# Patient Record
Sex: FEMALE | Race: BLACK OR AFRICAN AMERICAN | Employment: FULL TIME | URBAN - METROPOLITAN AREA
[De-identification: names, ages, dates, MRNs, and addresses within clinical notes are randomized per-mention and may not be internally consistent; named-entity substitution may affect disease eponyms.]

---

## 2021-04-30 PROBLEM — Z34.00 PRIMIGRAVIDA, ANTEPARTUM: Status: ACTIVE | Noted: 2021-04-30

## 2021-05-05 PROBLEM — O43.199 MARGINAL INSERTION OF UMBILICAL CORD AFFECTING MANAGEMENT OF MOTHER: Status: ACTIVE | Noted: 2021-05-05

## 2021-05-10 PROBLEM — O34.42 HISTORY OF CERVICAL LEEP BIOPSY AFFECTING CARE OF MOTHER, ANTEPARTUM, SECOND TRIMESTER: Status: ACTIVE | Noted: 2021-05-10

## 2021-05-10 PROBLEM — Z3A.24 24 WEEKS GESTATION OF PREGNANCY: Status: ACTIVE | Noted: 2021-05-10

## 2021-05-11 PROBLEM — O35.9XX0 SUSPECTED FETAL ANOMALY, ANTEPARTUM: Status: ACTIVE | Noted: 2021-05-11

## 2021-05-11 PROBLEM — O36.8390 FETAL ARRHYTHMIA AFFECTING PREGNANCY, ANTEPARTUM: Status: ACTIVE | Noted: 2021-05-11

## 2021-06-03 PROBLEM — O43.199 MARGINAL INSERTION OF UMBILICAL CORD AFFECTING MANAGEMENT OF MOTHER: Status: RESOLVED | Noted: 2021-05-05 | Resolved: 2021-06-03

## 2021-06-03 PROBLEM — O99.013 ANEMIA AFFECTING PREGNANCY IN THIRD TRIMESTER: Status: ACTIVE | Noted: 2021-06-03

## 2021-06-03 PROBLEM — O09.92 HIGH-RISK PREGNANCY IN SECOND TRIMESTER: Status: ACTIVE | Noted: 2021-04-30

## 2021-06-03 PROBLEM — Z3A.27 27 WEEKS GESTATION OF PREGNANCY: Status: ACTIVE | Noted: 2021-06-03

## 2021-07-01 ENCOUNTER — HOSPITAL ENCOUNTER (OUTPATIENT)
Dept: LAB | Age: 26
Discharge: HOME | End: 2021-07-01
Payer: COMMERCIAL

## 2021-07-01 DIAGNOSIS — O99.013 ANEMIA AFFECTING PREGNANCY IN THIRD TRIMESTER: ICD-10-CM

## 2021-07-01 LAB
ALBUMIN SERPL-MCNC: 2.5 G/DL (ref 3.5–5)
ALBUMIN/GLOB SERPL: 0.6 {RATIO} (ref 1.2–3.5)
ALP SERPL-CCNC: 57 U/L (ref 50–136)
ALT SERPL-CCNC: 14 U/L (ref 12–65)
ANION GAP SERPL CALC-SCNC: 12 MMOL/L (ref 7–16)
APPEARANCE UR: ABNORMAL
AST SERPL-CCNC: 52 U/L (ref 15–37)
BACTERIA URNS QL MICRO: ABNORMAL /HPF
BASOPHILS # BLD: 0 K/UL (ref 0–0.2)
BASOPHILS NFR BLD: 0 % (ref 0–2)
BILIRUB SERPL-MCNC: 0.5 MG/DL (ref 0.2–1.1)
BILIRUB UR QL: NEGATIVE
BUN SERPL-MCNC: 3 MG/DL (ref 6–23)
CALCIUM SERPL-MCNC: 7.6 MG/DL (ref 8.3–10.4)
CASTS URNS QL MICRO: ABNORMAL /LPF
CHLORIDE SERPL-SCNC: 107 MMOL/L (ref 98–107)
CO2 SERPL-SCNC: 18 MMOL/L (ref 21–32)
COLOR UR: YELLOW
CREAT SERPL-MCNC: 0.41 MG/DL (ref 0.6–1)
CRYSTALS URNS QL MICRO: 0 /LPF
DAT POLY-SP REAG RBC QL: NORMAL
DIFFERENTIAL METHOD BLD: ABNORMAL
EOSINOPHIL # BLD: 0.1 K/UL (ref 0–0.8)
EOSINOPHIL NFR BLD: 1 % (ref 0.5–7.8)
EPI CELLS #/AREA URNS HPF: ABNORMAL /HPF
ERYTHROCYTE [DISTWIDTH] IN BLOOD BY AUTOMATED COUNT: 14.9 % (ref 11.9–14.6)
ERYTHROCYTE [SEDIMENTATION RATE] IN BLOOD: 2 MM/HR (ref 0–20)
FERRITIN SERPL-MCNC: 27 NG/ML (ref 8–388)
FOLATE SERPL-MCNC: 29.2 NG/ML (ref 3.1–17.5)
GLOBULIN SER CALC-MCNC: 4.2 G/DL (ref 2.3–3.5)
GLUCOSE SERPL-MCNC: 152 MG/DL (ref 65–100)
GLUCOSE UR STRIP.AUTO-MCNC: NEGATIVE MG/DL
HAV IGM SER QL: NONREACTIVE
HBV CORE IGM SER QL: NONREACTIVE
HBV SURFACE AG SER QL: NONREACTIVE
HCT VFR BLD AUTO: 27.2 % (ref 35.8–46.3)
HCV AB SER QL: NONREACTIVE
HGB BLD-MCNC: 9.8 G/DL (ref 11.7–15.4)
HGB UR QL STRIP: NEGATIVE
IMM GRANULOCYTES # BLD AUTO: 0.3 K/UL (ref 0–0.5)
IMM GRANULOCYTES NFR BLD AUTO: 4 % (ref 0–5)
IMM RETICS NFR: 24.2 % (ref 3–15.9)
IRON SATN MFR SERPL: 16 %
IRON SERPL-MCNC: 52 UG/DL (ref 35–150)
KETONES UR QL STRIP.AUTO: NEGATIVE MG/DL
LDH SERPL L TO P-CCNC: 166 U/L (ref 100–190)
LEUKOCYTE ESTERASE UR QL STRIP.AUTO: ABNORMAL
LYMPHOCYTES # BLD: 0.4 K/UL (ref 0.5–4.6)
LYMPHOCYTES NFR BLD: 5 % (ref 13–44)
MCH RBC QN AUTO: 31.7 PG (ref 26.1–32.9)
MCHC RBC AUTO-ENTMCNC: 36 G/DL (ref 31.4–35)
MCV RBC AUTO: 88 FL (ref 79.6–97.8)
MONOCYTES # BLD: 0.4 K/UL (ref 0.1–1.3)
MONOCYTES NFR BLD: 5 % (ref 4–12)
MUCOUS THREADS URNS QL MICRO: 0 /LPF
NEUTS SEG # BLD: 6.8 K/UL (ref 1.7–8.2)
NEUTS SEG NFR BLD: 85 % (ref 43–78)
NITRITE UR QL STRIP.AUTO: NEGATIVE
NRBC # BLD: 0.54 K/UL (ref 0–0.2)
PH UR STRIP: 6.5 [PH] (ref 5–9)
PHOSPHATE SERPL-MCNC: 2.9 MG/DL (ref 2.5–4.5)
PLATELET # BLD AUTO: 263 K/UL (ref 150–450)
PMV BLD AUTO: 11.2 FL (ref 9.4–12.3)
POTASSIUM SERPL-SCNC: 3.3 MMOL/L (ref 3.5–5.1)
PROT SERPL-MCNC: 6.7 G/DL (ref 6.3–8.2)
PROT UR STRIP-MCNC: NEGATIVE MG/DL
RBC # BLD AUTO: 3.09 M/UL (ref 4.05–5.2)
RBC #/AREA URNS HPF: ABNORMAL /HPF
RETICS # AUTO: 0.05 M/UL (ref 0.03–0.1)
RETICS/RBC NFR AUTO: 1.7 % (ref 0.3–2)
SODIUM SERPL-SCNC: 137 MMOL/L (ref 136–145)
SP GR UR REFRACTOMETRY: 1.02 (ref 1–1.02)
T4 FREE SERPL-MCNC: 1.5 NG/DL (ref 0.78–1.4)
TIBC SERPL-MCNC: 332 UG/DL (ref 250–450)
TSH SERPL DL<=0.005 MIU/L-ACNC: 1.79 UIU/ML (ref 0.36–3)
URATE SERPL-MCNC: 3.6 MG/DL (ref 2.6–6)
UROBILINOGEN UR QL STRIP.AUTO: 0.2 EU/DL (ref 0.2–1)
VIT B12 SERPL-MCNC: 214 PG/ML (ref 193–986)
WBC # BLD AUTO: 8 K/UL (ref 4.3–11.1)
WBC URNS QL MICRO: ABNORMAL /HPF

## 2021-07-01 PROCEDURE — 82607 VITAMIN B-12: CPT

## 2021-07-01 PROCEDURE — 84550 ASSAY OF BLOOD/URIC ACID: CPT

## 2021-07-01 PROCEDURE — 84238 ASSAY NONENDOCRINE RECEPTOR: CPT

## 2021-07-01 PROCEDURE — 84443 ASSAY THYROID STIM HORMONE: CPT

## 2021-07-01 PROCEDURE — 83615 LACTATE (LD) (LDH) ENZYME: CPT

## 2021-07-01 PROCEDURE — 86038 ANTINUCLEAR ANTIBODIES: CPT

## 2021-07-01 PROCEDURE — 86880 COOMBS TEST DIRECT: CPT

## 2021-07-01 PROCEDURE — 82747 ASSAY OF FOLIC ACID RBC: CPT

## 2021-07-01 PROCEDURE — 83540 ASSAY OF IRON: CPT

## 2021-07-01 PROCEDURE — 83020 HEMOGLOBIN ELECTROPHORESIS: CPT

## 2021-07-01 PROCEDURE — 80074 ACUTE HEPATITIS PANEL: CPT

## 2021-07-01 PROCEDURE — 36415 COLL VENOUS BLD VENIPUNCTURE: CPT

## 2021-07-01 PROCEDURE — 81015 MICROSCOPIC EXAM OF URINE: CPT

## 2021-07-01 PROCEDURE — 85025 COMPLETE CBC W/AUTO DIFF WBC: CPT

## 2021-07-01 PROCEDURE — 80053 COMPREHEN METABOLIC PANEL: CPT

## 2021-07-01 PROCEDURE — 82728 ASSAY OF FERRITIN: CPT

## 2021-07-01 PROCEDURE — 85652 RBC SED RATE AUTOMATED: CPT

## 2021-07-01 PROCEDURE — 82746 ASSAY OF FOLIC ACID SERUM: CPT

## 2021-07-01 PROCEDURE — 81003 URINALYSIS AUTO W/O SCOPE: CPT

## 2021-07-01 PROCEDURE — 84100 ASSAY OF PHOSPHORUS: CPT

## 2021-07-01 PROCEDURE — 84439 ASSAY OF FREE THYROXINE: CPT

## 2021-07-01 PROCEDURE — 85046 RETICYTE/HGB CONCENTRATE: CPT

## 2021-07-02 LAB
FOLATE BLD-MCNC: 311 NG/ML
FOLATE RBC-MCNC: 1087 NG/ML
HCT VFR BLD AUTO: 28.6 % (ref 34–46.6)
PERIPHERAL SMEAR,PSM: NORMAL

## 2021-07-03 LAB — TRANSFERRIN SERPL-SCNC: 23.9 NMOL/L (ref 12.2–27.3)

## 2021-07-06 LAB
DEPRECATED HGB OTHER BLD-IMP: NORMAL %
HGB A MFR BLD: 97.3 % (ref 96.4–98.8)
HGB A2 MFR BLD COLUMN CHROM: 2.7 % (ref 1.8–3.2)
HGB C MFR BLD: NORMAL %
HGB F MFR BLD: 0 % (ref 0–2)
HGB FRACT BLD-IMP: NORMAL
HGB S BLD QL SOLY: NORMAL
HGB S MFR BLD: 0 %

## 2021-08-05 ENCOUNTER — HOSPITAL ENCOUNTER (OUTPATIENT)
Dept: LAB | Age: 26
Discharge: HOME | End: 2021-08-05

## 2021-08-05 PROCEDURE — 88305 TISSUE EXAM BY PATHOLOGIST: CPT

## 2021-08-12 PROBLEM — O98.513 HERPES VIRUS INFECTION IN MOTHER DURING THIRD TRIMESTER OF PREGNANCY: Status: ACTIVE | Noted: 2021-08-12

## 2021-08-12 PROBLEM — B00.9 HERPES VIRUS INFECTION IN MOTHER DURING THIRD TRIMESTER OF PREGNANCY: Status: ACTIVE | Noted: 2021-08-12

## 2021-08-21 ENCOUNTER — HOSPITAL ENCOUNTER (INPATIENT)
Age: 26
LOS: 3 days | Discharge: HOME | DRG: 560 | End: 2021-08-24
Attending: OBSTETRICS & GYNECOLOGY | Admitting: OBSTETRICS & GYNECOLOGY
Payer: COMMERCIAL

## 2021-08-21 DIAGNOSIS — Z34.90 ENCOUNTER FOR INDUCTION OF LABOR: ICD-10-CM

## 2021-08-21 PROBLEM — Z37.9 NORMAL LABOR: Status: ACTIVE | Noted: 2021-08-21

## 2021-08-21 LAB
ERYTHROCYTE [DISTWIDTH] IN BLOOD BY AUTOMATED COUNT: 17.4 % (ref 11.9–14.6)
HCT VFR BLD AUTO: 30.1 % (ref 35.8–46.3)
HGB BLD-MCNC: 10.2 G/DL (ref 11.7–15.4)
MCH RBC QN AUTO: 34 PG (ref 26.1–32.9)
MCHC RBC AUTO-ENTMCNC: 34 G/DL (ref 31.4–35)
MCV RBC AUTO: 90.4 FL (ref 79.6–97.8)
NRBC # BLD: 0.1 K/UL (ref 0–0.2)
PLATELET # BLD AUTO: 221 K/UL (ref 150–450)
PMV BLD AUTO: 11.3 FL (ref 9.4–12.3)
RBC # BLD AUTO: 3.33 M/UL (ref 4.05–5.2)
RPR SER QL: NONREACTIVE
WBC # BLD AUTO: 8.8 K/UL (ref 4.3–11.1)

## 2021-08-21 PROCEDURE — 3E0P7VZ INTRODUCTION OF HORMONE INTO FEMALE REPRODUCTIVE, VIA NATURAL OR ARTIFICIAL OPENING: ICD-10-PCS | Performed by: OBSTETRICS & GYNECOLOGY

## 2021-08-21 PROCEDURE — 65270000029 HC RM PRIVATE

## 2021-08-21 PROCEDURE — 85027 COMPLETE CBC AUTOMATED: CPT

## 2021-08-21 PROCEDURE — 86592 SYPHILIS TEST NON-TREP QUAL: CPT

## 2021-08-21 PROCEDURE — 74011250637 HC RX REV CODE- 250/637: Performed by: OBSTETRICS & GYNECOLOGY

## 2021-08-21 PROCEDURE — 86901 BLOOD TYPING SEROLOGIC RH(D): CPT

## 2021-08-21 RX ORDER — DEXTROSE, SODIUM CHLORIDE, SODIUM LACTATE, POTASSIUM CHLORIDE, AND CALCIUM CHLORIDE 5; .6; .31; .03; .02 G/100ML; G/100ML; G/100ML; G/100ML; G/100ML
125 INJECTION, SOLUTION INTRAVENOUS CONTINUOUS
Status: DISCONTINUED | OUTPATIENT
Start: 2021-08-21 | End: 2021-08-22

## 2021-08-21 RX ORDER — MINERAL OIL
120 OIL (ML) ORAL
Status: ACTIVE | OUTPATIENT
Start: 2021-08-21 | End: 2021-08-22

## 2021-08-21 RX ORDER — BUTORPHANOL TARTRATE 2 MG/ML
1 INJECTION INTRAMUSCULAR; INTRAVENOUS
Status: DISCONTINUED | OUTPATIENT
Start: 2021-08-21 | End: 2021-08-22

## 2021-08-21 RX ORDER — OXYTOCIN/RINGER'S LACTATE 30/500 ML
0-20 PLASTIC BAG, INJECTION (ML) INTRAVENOUS
Status: DISCONTINUED | OUTPATIENT
Start: 2021-08-21 | End: 2021-08-22

## 2021-08-21 RX ORDER — OXYTOCIN/RINGER'S LACTATE 30/500 ML
10 PLASTIC BAG, INJECTION (ML) INTRAVENOUS AS NEEDED
Status: DISCONTINUED | OUTPATIENT
Start: 2021-08-21 | End: 2021-08-22

## 2021-08-21 RX ORDER — LIDOCAINE HYDROCHLORIDE 20 MG/ML
JELLY TOPICAL
Status: ACTIVE | OUTPATIENT
Start: 2021-08-21 | End: 2021-08-22

## 2021-08-21 RX ORDER — LIDOCAINE HYDROCHLORIDE 10 MG/ML
1 INJECTION INFILTRATION; PERINEURAL
Status: ACTIVE | OUTPATIENT
Start: 2021-08-21 | End: 2021-08-22

## 2021-08-21 RX ORDER — SODIUM CHLORIDE 0.9 % (FLUSH) 0.9 %
5-40 SYRINGE (ML) INJECTION EVERY 8 HOURS
Status: DISCONTINUED | OUTPATIENT
Start: 2021-08-21 | End: 2021-08-22

## 2021-08-21 RX ORDER — SODIUM CHLORIDE 0.9 % (FLUSH) 0.9 %
5-40 SYRINGE (ML) INJECTION AS NEEDED
Status: DISCONTINUED | OUTPATIENT
Start: 2021-08-21 | End: 2021-08-22

## 2021-08-21 RX ORDER — OXYTOCIN/RINGER'S LACTATE 30/500 ML
87.3 PLASTIC BAG, INJECTION (ML) INTRAVENOUS AS NEEDED
Status: DISCONTINUED | OUTPATIENT
Start: 2021-08-21 | End: 2021-08-22

## 2021-08-21 RX ADMIN — MISOPROSTOL 50 MCG: 100 TABLET ORAL at 18:36

## 2021-08-21 RX ADMIN — MISOPROSTOL 50 MCG: 100 TABLET ORAL at 23:13

## 2021-08-22 ENCOUNTER — ANESTHESIA (OUTPATIENT)
Dept: LABOR AND DELIVERY | Age: 26
DRG: 560 | End: 2021-08-22
Payer: COMMERCIAL

## 2021-08-22 ENCOUNTER — ANESTHESIA EVENT (OUTPATIENT)
Dept: LABOR AND DELIVERY | Age: 26
DRG: 560 | End: 2021-08-22
Payer: COMMERCIAL

## 2021-08-22 PROBLEM — Z34.90 ENCOUNTER FOR INDUCTION OF LABOR: Status: ACTIVE | Noted: 2021-08-22

## 2021-08-22 PROBLEM — O09.93 HIGH-RISK PREGNANCY IN THIRD TRIMESTER: Status: ACTIVE | Noted: 2021-04-30

## 2021-08-22 LAB
ABO + RH BLD: NORMAL
BLOOD GROUP ANTIBODIES SERPL: NORMAL
COVID-19 RAPID TEST, COVR: NOT DETECTED
SOURCE, COVRS: NORMAL
SPECIMEN EXP DATE BLD: NORMAL

## 2021-08-22 PROCEDURE — 77030011943

## 2021-08-22 PROCEDURE — 77030011945 HC CATH URIN INT ST MENT -A

## 2021-08-22 PROCEDURE — 65270000029 HC RM PRIVATE

## 2021-08-22 PROCEDURE — 74011000250 HC RX REV CODE- 250: Performed by: ANESTHESIOLOGY

## 2021-08-22 PROCEDURE — 10907ZC DRAINAGE OF AMNIOTIC FLUID, THERAPEUTIC FROM PRODUCTS OF CONCEPTION, VIA NATURAL OR ARTIFICIAL OPENING: ICD-10-PCS | Performed by: OBSTETRICS & GYNECOLOGY

## 2021-08-22 PROCEDURE — U0005 INFEC AGEN DETEC AMPLI PROBE: HCPCS

## 2021-08-22 PROCEDURE — 76060000078 HC EPIDURAL ANESTHESIA

## 2021-08-22 PROCEDURE — 74011250636 HC RX REV CODE- 250/636: Performed by: OBSTETRICS & GYNECOLOGY

## 2021-08-22 PROCEDURE — 87635 SARS-COV-2 COVID-19 AMP PRB: CPT

## 2021-08-22 PROCEDURE — 74011250636 HC RX REV CODE- 250/636: Performed by: STUDENT IN AN ORGANIZED HEALTH CARE EDUCATION/TRAINING PROGRAM

## 2021-08-22 PROCEDURE — 77030014125 HC TY EPDRL BBMI -B: Performed by: ANESTHESIOLOGY

## 2021-08-22 PROCEDURE — 3E033VJ INTRODUCTION OF OTHER HORMONE INTO PERIPHERAL VEIN, PERCUTANEOUS APPROACH: ICD-10-PCS | Performed by: OBSTETRICS & GYNECOLOGY

## 2021-08-22 PROCEDURE — 75410000000 HC DELIVERY VAGINAL/SINGLE

## 2021-08-22 PROCEDURE — 77030018846 HC SOL IRR STRL H20 ICUM -A

## 2021-08-22 PROCEDURE — 75410000002 HC LABOR FEE PER 1 HR

## 2021-08-22 PROCEDURE — 74011000250 HC RX REV CODE- 250: Performed by: OBSTETRICS & GYNECOLOGY

## 2021-08-22 PROCEDURE — 59409 OBSTETRICAL CARE: CPT | Performed by: OBSTETRICS & GYNECOLOGY

## 2021-08-22 PROCEDURE — A4300 CATH IMPL VASC ACCESS PORTAL: HCPCS | Performed by: ANESTHESIOLOGY

## 2021-08-22 PROCEDURE — 74011250637 HC RX REV CODE- 250/637: Performed by: OBSTETRICS & GYNECOLOGY

## 2021-08-22 PROCEDURE — 2709999900 HC NON-CHARGEABLE SUPPLY

## 2021-08-22 PROCEDURE — 75410000003 HC RECOV DEL/VAG/CSECN EA 0.5 HR

## 2021-08-22 PROCEDURE — 36415 COLL VENOUS BLD VENIPUNCTURE: CPT

## 2021-08-22 RX ORDER — HYDROCORTISONE 25 MG/G
CREAM TOPICAL
Status: DISCONTINUED | OUTPATIENT
Start: 2021-08-22 | End: 2021-08-24 | Stop reason: HOSPADM

## 2021-08-22 RX ORDER — ROPIVACAINE HYDROCHLORIDE 2 MG/ML
INJECTION, SOLUTION EPIDURAL; INFILTRATION; PERINEURAL AS NEEDED
Status: DISCONTINUED | OUTPATIENT
Start: 2021-08-22 | End: 2021-08-22 | Stop reason: HOSPADM

## 2021-08-22 RX ORDER — CEFAZOLIN SODIUM/WATER 2 G/20 ML
2 SYRINGE (ML) INTRAVENOUS ONCE
Status: COMPLETED | OUTPATIENT
Start: 2021-08-22 | End: 2021-08-22

## 2021-08-22 RX ORDER — HYDROCODONE BITARTRATE AND ACETAMINOPHEN 5; 325 MG/1; MG/1
1 TABLET ORAL
Status: DISCONTINUED | OUTPATIENT
Start: 2021-08-22 | End: 2021-08-24 | Stop reason: HOSPADM

## 2021-08-22 RX ORDER — ONDANSETRON 4 MG/1
8 TABLET, ORALLY DISINTEGRATING ORAL
Status: DISCONTINUED | OUTPATIENT
Start: 2021-08-22 | End: 2021-08-24 | Stop reason: HOSPADM

## 2021-08-22 RX ORDER — IBUPROFEN 800 MG/1
800 TABLET ORAL EVERY 6 HOURS
Status: DISCONTINUED | OUTPATIENT
Start: 2021-08-23 | End: 2021-08-24 | Stop reason: HOSPADM

## 2021-08-22 RX ORDER — ZOLPIDEM TARTRATE 5 MG/1
5 TABLET ORAL
Status: DISCONTINUED | OUTPATIENT
Start: 2021-08-22 | End: 2021-08-24 | Stop reason: HOSPADM

## 2021-08-22 RX ORDER — NALOXONE HYDROCHLORIDE 0.4 MG/ML
0.4 INJECTION, SOLUTION INTRAMUSCULAR; INTRAVENOUS; SUBCUTANEOUS AS NEEDED
Status: DISCONTINUED | OUTPATIENT
Start: 2021-08-22 | End: 2021-08-24 | Stop reason: HOSPADM

## 2021-08-22 RX ORDER — DOCUSATE SODIUM 100 MG/1
100 CAPSULE, LIQUID FILLED ORAL 2 TIMES DAILY
Status: DISCONTINUED | OUTPATIENT
Start: 2021-08-22 | End: 2021-08-24 | Stop reason: HOSPADM

## 2021-08-22 RX ORDER — LIDOCAINE HYDROCHLORIDE AND EPINEPHRINE 15; 5 MG/ML; UG/ML
INJECTION, SOLUTION EPIDURAL AS NEEDED
Status: DISCONTINUED | OUTPATIENT
Start: 2021-08-22 | End: 2021-08-22 | Stop reason: HOSPADM

## 2021-08-22 RX ORDER — DIPHENHYDRAMINE HCL 25 MG
25 CAPSULE ORAL
Status: DISCONTINUED | OUTPATIENT
Start: 2021-08-22 | End: 2021-08-24 | Stop reason: HOSPADM

## 2021-08-22 RX ADMIN — DOCUSATE SODIUM 100 MG: 100 CAPSULE, LIQUID FILLED ORAL at 21:23

## 2021-08-22 RX ADMIN — ROPIVACAINE HYDROCHLORIDE 4 ML: 2 INJECTION, SOLUTION EPIDURAL; INFILTRATION at 12:03

## 2021-08-22 RX ADMIN — SODIUM CHLORIDE, SODIUM LACTATE, POTASSIUM CHLORIDE, CALCIUM CHLORIDE, AND DEXTROSE MONOHYDRATE 125 ML/HR: 600; 310; 30; 20; 5 INJECTION, SOLUTION INTRAVENOUS at 14:29

## 2021-08-22 RX ADMIN — MISOPROSTOL 50 MCG: 100 TABLET ORAL at 03:00

## 2021-08-22 RX ADMIN — LIDOCAINE HYDROCHLORIDE,EPINEPHRINE BITARTRATE 3 ML: 15; .005 INJECTION, SOLUTION EPIDURAL; INFILTRATION; INTRACAUDAL; PERINEURAL at 12:01

## 2021-08-22 RX ADMIN — Medication 1 SPRAY: at 21:23

## 2021-08-22 RX ADMIN — ROPIVACAINE HYDROCHLORIDE 8 ML/HR: 2 INJECTION, SOLUTION EPIDURAL; INFILTRATION at 12:09

## 2021-08-22 RX ADMIN — Medication 2 MILLI-UNITS/MIN: at 07:03

## 2021-08-22 RX ADMIN — IBUPROFEN 800 MG: 800 TABLET, FILM COATED ORAL at 20:25

## 2021-08-22 RX ADMIN — ROPIVACAINE HYDROCHLORIDE 4 ML: 2 INJECTION, SOLUTION EPIDURAL; INFILTRATION at 12:07

## 2021-08-22 RX ADMIN — WITCH HAZEL 1 PAD: 500 SOLUTION RECTAL; TOPICAL at 21:23

## 2021-08-22 RX ADMIN — CEFAZOLIN SODIUM 2 G: 100 INJECTION, POWDER, LYOPHILIZED, FOR SOLUTION INTRAVENOUS at 19:55

## 2021-08-22 RX ADMIN — SODIUM CHLORIDE, SODIUM LACTATE, POTASSIUM CHLORIDE, CALCIUM CHLORIDE, AND DEXTROSE MONOHYDRATE 125 ML/HR: 600; 310; 30; 20; 5 INJECTION, SOLUTION INTRAVENOUS at 06:37

## 2021-08-22 NOTE — L&D DELIVERY NOTE
Delivery Summary    Patient: Gayla Lyons MRN: [id number]  SSN: [id number]    YOB: 1995  Age: 23 y.o. Sex: female       Information for the patient's :  eRnetta Gray [[id number]]       Labor Events:    Labor: No    Steroids: None   Cervical Ripening Date/Time: 2021 6:36 PM   Cervical Ripening Type: Misoprostol   Antibiotics During Labor: No   Rupture Identifier:      Rupture Date/Time: 2021 10:33 AM   Rupture Type: AROM   Amniotic Fluid Volume: Moderate    Amniotic Fluid Description: Clear    Amniotic Fluid Odor:      Induction: Oxytocin;Prostaglandins       Induction Date/Time: 2021 6:30 PM    Indications for Induction: Elective    Augmentation: AROM   Augmentation Date/Time:      Indications for Augmentation: Ineffective Contraction Pattern   Labor complications: Additional complications:        Delivery Events:  Indications For Episiotomy:     Episiotomy: None   Perineal Laceration(s): 2nd   Repaired:     Periurethral Laceration Location:      Repaired:     Labial Laceration Location:     Repaired:     Sulcal Laceration Location:     Repaired:     Vaginal Laceration Location:     Repaired:     Cervical Laceration Location:     Repaired:     Repair Suture: Chromic 3-0   Number of Repair Packets: 1   Estimated Blood Loss (ml):  ml   Quantitative Blood Loss (ml)                Delivery Date: 2021    Delivery Time: 6:51 PM  Delivery Type: Vaginal, Spontaneous  Sex:  Male    Gestational Age: 39w0d   Delivery Clinician:  Ernesto Awad  Living Status: Living   Delivery Location: L&D            APGARS  One minute Five minutes Ten minutes   Skin color: 1   1        Heart rate: 2   2        Grimace: 2   2        Muscle tone: 2   2        Breathin   2        Totals: 9   9            Presentation: Vertex    Position: Right Occiput Anterior  Resuscitation Method:  Suctioning-bulb; Tactile Stimulation     Meconium Stained: None      Cord Information: 3 Vessels  Complications: None  Cord around:    Delayed cord clamping? Yes  Cord clamped date/time:2021  6:52 PM  Disposition of Cord Blood: Lab    Blood Gases Sent?: No    Placenta:  Date/Time: 2021  6:58 PM  Removal: Expressed      Appearance: Normal;Intact      Measurements:  Birth Weight: 6 lb 14.1 oz (3.12 kg)      Birth Length: 51 cm      Head Circumference: 33.5 cm      Chest Circumference: 33 cm     Abdominal Girth: Other Providers:   Jahaira BRADLEY;REGINA SIMON;IVAN FIORE;IVAN KRUEGER, Obstetrician;Primary Nurse;Primary  Nurse;Scrub Tech           Group B Strep: No results found for: GRBSEXT, GRBSEXT  Information for the patient's :  Tasia Barrera [[id number]]   No results found for: ABORH, PCTABR, PCTDIG, BILI, ABORHEXT, ABORH     No results for input(s): PCO2CB, PO2CB, HCO3I, SO2I, IBD, PTEMPI, SPECTI, PHICB, ISITE, IDEV, IALLEN in the last 72 hours. Mild uterine atony easily managed with ivf with pitocin, uterine massage, emptying her bladder. Pt's blood noted to be grossly lipemic by lab a few wks ago. I again noted it at delivery. Will ck hgb and lipid profile in AM  Pt denies fam hx early heart dz.

## 2021-08-22 NOTE — PROGRESS NOTES
08/22/21 1935 08/22/21 1938   Maternal Vital Signs   Temp (!) 101.3 °F (38.5 °C) (!) 101.2 °F (38.4 °C)   Temp Source Oral Axillary   Dr. Josie Dinh notified of pt temperature. Verbal orders received for 2 g of Ancef Now.

## 2021-08-22 NOTE — PROGRESS NOTES
08/22/21 0300   Cervical Exam   Dilation (cm) 1   Eff 60 %   Station -2   Position Posterior   Cervical Consistency Moderate   Vaginal exam done by? David Ricketts, RN   Cytotec 50mg given buccal as ordered. Pt denies needs.

## 2021-08-22 NOTE — PROGRESS NOTES
08/22/21 1303   Membranes   Amniotic Fluid Description Clear   Amniotic Fluid Volume  Moderate   Cervical Exam   Dilation (cm) 4   Eff 75 %   Station -1   Position Anterior   Cervical Consistency Soft   Vaginal exam done by?  1201 Joe Chen RN

## 2021-08-22 NOTE — PROGRESS NOTES
08/22/21 1509   Membranes   Amniotic Fluid Description Clear   Amniotic Fluid Volume  Moderate   Cervical Exam   Dilation (cm) 5.5   Eff 80 %   Station -1   Position Anterior   Cervical Consistency Soft   Vaginal exam done by?  KIT RN   Bladder emptied. Marlin care done. Repositioned to left side on peanut ball.

## 2021-08-22 NOTE — PROGRESS NOTES
08/21/21 2310   Cervical Exam   Dilation (cm) 1   Eff 60 %   Station -2   Position Posterior   Cervical Consistency Moderate   Vaginal exam done by? Pierre Britt RN   Cytotec 50mg given buccal as ordered. Pt denies needs at this time. Enc to call with any needs.

## 2021-08-22 NOTE — ANESTHESIA PREPROCEDURE EVALUATION
Relevant Problems   No relevant active problems       Anesthetic History          Comments: [place] teeth with sedation only, no family problems known with GA     Review of Systems / Medical History  Patient summary reviewed and pertinent labs reviewed    Pulmonary  Within defined limits                 Neuro/Psych   Within defined limits           Cardiovascular                  Exercise tolerance: >4 METS  Comments: hypercholesterolemia   GI/Hepatic/Renal  Within defined limits              Endo/Other  Within defined limits           Other Findings              Physical Exam    Airway  Mallampati: I  TM Distance: 4 - 6 cm  Neck ROM: normal range of motion   Mouth opening: Normal     Cardiovascular    Rhythm: regular  Rate: normal         Dental  No notable dental hx       Pulmonary  Breath sounds clear to auscultation               Abdominal         Other Findings            Anesthetic Plan    ASA: 2  Anesthesia type: epidural            Anesthetic plan and risks discussed with: Patient and Mother

## 2021-08-22 NOTE — PROGRESS NOTES
08/22/21 0600   Cervical Exam   Dilation (cm) 1.5   Eff 60 %   Station -2   Position Posterior   Vaginal exam done by?   dAilene Link RN

## 2021-08-22 NOTE — H&P
History & Physical    Name: Zenaida Johnson MRN: [id number]  SSN: [id number]    YOB: 1995  Age: 23 y.o. Sex: female      Subjective:     Estimated Date of Delivery: 21  OB History    Para Term  AB Living   1             SAB TAB Ectopic Molar Multiple Live Births                    # Outcome Date GA Lbr Deshaun/2nd Weight Sex Delivery Anes PTL Lv   1 Current                Ms. Akil Le is admitted with pregnancy at 39w0d for induction of labor due to pt desires induction. Prenatal course was complicated by anemia and unusual vulvar lesion which was bx'd 17d ago and has since decreased in size. Path c/w HSV. Serology could not be run due to being grossly lipemic. Pt has been on valtrex and reports no vulvar sores, tingling etc.   Please see prenatal records for details. Past Medical History:   Diagnosis Date    Abnormal Papanicolaou smear of cervix     Anemia affecting pregnancy in third trimester 6/3/2021    ARMANI III (cervical intraepithelial neoplasia grade III) with severe dysplasia 2020    cin2-3 on leep. Pt says she was told it was moderate dysplasia.  High cholesterol     Marginal insertion of umbilical cord affecting management of mother 2021    Rh negative state in antepartum period      Past Surgical History:   Procedure Laterality Date    HX LEEP PROCEDURE  2020    HX WISDOM TEETH EXTRACTION       Social History     Occupational History    Not on file   Tobacco Use    Smoking status: Never Smoker    Smokeless tobacco: Never Used   Substance and Sexual Activity    Alcohol use: Not Currently    Drug use: Never    Sexual activity: Yes     Partners: Male     Birth control/protection: None     Family History   Problem Relation Age of Onset    Hypertension Mother     Diabetes Neg Hx        No Known Allergies  Prior to Admission medications    Medication Sig Start Date End Date Taking?  Authorizing Provider   valACYclovir (VALTREX) 500 mg tablet Take 1 Tablet by mouth two (2) times a day for 10 days. 8/12/21 8/22/21 Yes Lansing Canavan, MD   ferrous sulfate 325 mg (65 mg iron) tablet Take 1 Tablet by mouth daily. With vitamin C source to help abosption 7/1/21  Yes Orlando Mooney MD   omega-3/dha/epa/dpa/fish oil (OMEGA-3 2100 PO) Take  by mouth. Yes Provider, Historical   prenatal multivit-ca-min-fe-fa tab Take  by mouth. Yes Provider, Historical   valACYclovir (VALTREX) 1 gram tablet Take 1 Tablet by mouth daily. 8/12/21   Lansing Canavan, MD   acetaminophen (Tylenol Extra Strength) 500 mg tablet Take  by mouth every six (6) hours as needed for Pain. Patient not taking: Reported on 8/21/2021    Provider, Historical        Review of Systems: Pertinent items are noted in the History of Present Illness. Objective:     Vitals:  Vitals:    08/22/21 0838 08/22/21 0909 08/22/21 0930 08/22/21 1010   BP: 122/69 138/69  124/80   Pulse: 81 84  77   Resp:   18    Temp:    98.1 °F (36.7 °C)        Physical Exam:  Patient without distress. Heart: Regular rate and rhythm  Lung: clear to auscultation throughout lung fields, no wheezes, no rales, no rhonchi and normal respiratory effort  The area of depigmentation on R posterior L majora continues to decrease in size. No longer feels raised or \"cobblestone\". Cervical Exam: 3 cm dilated    60% effaced    -1 station    Membranes:  Artificial Rupture of Membranes;  Amniotic Fluid: medium amount of clear fluid  Fetal Heart Rate: Reactive          Prenatal Labs:   Lab Results   Component Value Date/Time    ABO/Rh(D) O NEGATIVE 08/21/2021 06:22 PM    Rubella, External Immune 01/13/2021 12:00 AM    HBsAg, External Negative 01/13/2021 12:00 AM    HIV, External Non reactive 01/13/2021 12:00 AM    RPR, External Non reactive 01/13/2021 12:00 AM    Gonorrhea, External Negative 01/13/2021 12:00 AM    Chlamydia, External Negative 01/13/2021 12:00 AM    ABO,Rh O negative 01/13/2021 12:00 AM       Impression/Plan: Principal Problem:    Encounter for induction of labor (8/22/2021)    Active Problems:    High-risk pregnancy in third trimester (4/30/2021)      Overview: 1)transfer. Prenatal labs normal. Rubella immune. Gc/ct/tv neg. 2)hx LEEP. CL 3.9cm at 18wks      3)rh neg      5/11/2021 at [facility]: Normal anatomy and echo; Borderline accelerated fetal       growth; AC 78%, Overall 60%, BEBE 15.8 cm.            6/3/2021 at [facility]: Appropriate fetal growth; No PAC's      · No F/U MFM-refer back as needed      Anemia affecting pregnancy in third trimester (6/3/2021)      Overview: 6/2/21 Hgb 9.9      6/3/2021 at [facility]: Due to Hgb < 9.5, refered to hematology for consult and       iron infusions. · Referal sent in CC. · Seen by hematology 7-1. Rec po Fe. Nick cbc early August               Plan: Admit for induction of labor. Group B Strep negative. Hgb 10 on adm. Continue pitocin.

## 2021-08-22 NOTE — ANESTHESIA PROCEDURE NOTES
Epidural Block    Patient location during procedure: OB  Start time: 8/22/2021 11:55 AM  End time: 8/22/2021 12:01 PM  Reason for block: labor epidural  Staffing  Performed: attending   Anesthesiologist: Vadim Burger MD  Preanesthetic Checklist  Completed: patient identified, IV checked, site marked, risks and benefits discussed, surgical consent, monitors and equipment checked, pre-op evaluation and timeout performed  Block Placement  Patient position: sitting  Prep: ChloraPrep  Sterility prep: cap, drape, gloves, hand and mask  Sedation level: no sedation  Patient monitoring: continuous pulse oximetry, frequent blood pressure checks and heart rate  Approach: right paramedian  Location: lumbar  Lumbar location: L2-L3  Epidural  Loss of resistance technique: air  Guidance: landmark technique  Needle  Needle type: Tuohy   Needle gauge: 17 G  Needle length: 9 cm  Needle insertion depth: 3.5 cm  Catheter type: end hole  Catheter size: 19 G  Catheter at skin depth: 9 cm  Catheter securement method: surgical tape, clear occlusive dressing and liquid medical adhesive  Test dose: negative  Assessment  Number of attempts: 1  Procedure assessment: patient tolerated procedure well with no immediate complications  Additional Notes  Test 3ml 1.5% lidocaine+1:200k epi

## 2021-08-23 LAB
BLOOD BANK CMNT PATIENT-IMP: NORMAL
CHOLEST SERPL-MCNC: 233 MG/DL
FETAL SCREEN,FMHS: NORMAL
HCT VFR BLD AUTO: 30.4 % (ref 35.8–46.3)
HDLC SERPL-MCNC: 26 MG/DL (ref 40–60)
HDLC SERPL: 9 {RATIO}
HGB BLD-MCNC: 11.2 G/DL (ref 11.7–15.4)
LDLC SERPL CALC-MCNC: ABNORMAL MG/DL
LDLC SERPL DIRECT ASSAY-MCNC: 41 MG/DL
SARS-COV-2 AB, IGG QL: NONREACTIVE
SARS-COV-2, COV2: NOT DETECTED
SPECIMEN SOURCE, FCOV2M: NORMAL
TRIGL SERPL-MCNC: 1622 MG/DL (ref 35–150)
VLDLC SERPL CALC-MCNC: ABNORMAL MG/DL (ref 6–23)

## 2021-08-23 PROCEDURE — 74011250637 HC RX REV CODE- 250/637: Performed by: OBSTETRICS & GYNECOLOGY

## 2021-08-23 PROCEDURE — 65270000029 HC RM PRIVATE

## 2021-08-23 PROCEDURE — 86769 SARS-COV-2 COVID-19 ANTIBODY: CPT

## 2021-08-23 PROCEDURE — 74011250636 HC RX REV CODE- 250/636: Performed by: OBSTETRICS & GYNECOLOGY

## 2021-08-23 PROCEDURE — 83721 ASSAY OF BLOOD LIPOPROTEIN: CPT

## 2021-08-23 PROCEDURE — 36415 COLL VENOUS BLD VENIPUNCTURE: CPT

## 2021-08-23 PROCEDURE — 80061 LIPID PANEL: CPT

## 2021-08-23 PROCEDURE — 2709999900 HC NON-CHARGEABLE SUPPLY

## 2021-08-23 PROCEDURE — 85461 HEMOGLOBIN FETAL: CPT

## 2021-08-23 PROCEDURE — 85018 HEMOGLOBIN: CPT

## 2021-08-23 RX ADMIN — IBUPROFEN 800 MG: 800 TABLET, FILM COATED ORAL at 18:29

## 2021-08-23 RX ADMIN — RHO(D) IMMUNE GLOBULIN (HUMAN) 0.3 MG: 1500 SOLUTION INTRAMUSCULAR at 09:16

## 2021-08-23 RX ADMIN — IBUPROFEN 800 MG: 800 TABLET, FILM COATED ORAL at 09:15

## 2021-08-23 RX ADMIN — DOCUSATE SODIUM 100 MG: 100 CAPSULE, LIQUID FILLED ORAL at 18:28

## 2021-08-23 RX ADMIN — DOCUSATE SODIUM 100 MG: 100 CAPSULE, LIQUID FILLED ORAL at 09:15

## 2021-08-23 NOTE — PROGRESS NOTES
Admission assessment complete as noted. Patient oriented to room and unit. Plan of care reviewed and patient verbalizes understanding. Questions encouraged and answered. Patent encouraged to call for needs or concerns. Safety Teaching reviewed:   1. Hand hygiene prior to handling the infant. 2. Use of bulb syringe. 3. Bracelets with matching numbers are placed on mother and infant  4. An infant security tag  [facility]) is placed on the infant's ankle and monitored  5. All OB nurses wear pink Employee badges - do not give your baby to anyone without proper identification. 6. Never leave the baby alone in the room. 7. The infant should be placed on their back to sleep. on a firm mattress. No toys should be placed in the crib. (safe sleep video offered to view)  8. Never shake the baby (video offered to view)  9. Infant fall prevention - do not sleep with the baby, and place the baby in the crib while ambulating. 8. Mother and Baby Care booklet given to Mother.

## 2021-08-23 NOTE — PROGRESS NOTES
Dr. Evangelina Spencer notified of PP Maternal temp incase infant. Dr. Evangelina Spencer recommended COVID swab for mother. Due to acute onset of fever. 2148- Discussed Dr. Caruso Friday suggestion with Dr. Minta Bloch via telephone. Telephone orders received for COVID rapid and PCR. Carlito Sanchez RN on MIU notified.

## 2021-08-23 NOTE — PROGRESS NOTES
SBAR IN Report: Mother    Verbal report received from Gloria Perera RN (full name & credentials) on this patient, who is now being transferred from [facility] (unit) for routine progression of care. The patient is not wearing a green \"Anesthesia-Duramorph\" band. Report consisted of patient's Situation, Background, Assessment and Recommendations (SBAR). [place] ID bands were compared with the identification form, and verified with the patient and transferring nurse. Information from the SBAR and the [place] Report was reviewed with the transferring nurse; opportunity for questions and clarification provided.

## 2021-08-23 NOTE — PROGRESS NOTES
Patient up to bathroom with RN assistance. Marlin-care taught and completed. Questions encouraged and answered. Patient ambulating without difficulty, encouraged to call for needs or concerns. Verbalizes understanding.

## 2021-08-23 NOTE — PROGRESS NOTES
SBAR OUT Report: Mother    Verbal report given to [facility], RN (full name & credentials) on this patient, who is now being transferred to MIU (unit) for routine progression of care. The patient is not wearing a green \"Anesthesia-Duramorph\" band. Report consisted of patient's Situation, Background, Assessment and Recommendations (SBAR). [place] ID bands were compared with the identification form, and verified with the patient and receiving nurse. Information from the SBAR, Kardex, Procedure Summary, Intake/Output, MAR, Accordion, Recent Results and Med Rec Status and the [place] Report was reviewed with the receiving nurse; opportunity for questions and clarification provided.

## 2021-08-23 NOTE — PROGRESS NOTES
Progress Note                               Patient: Stefani Montoya MRN: [id number]  SSN: [id number]    YOB: 1995  Age: 23 y.o. Sex: female      Postpartum Day Number 1    Subjective:     Patient doing well postpartum without significant complaints. Voiding without difficulty. Patient reports normal lochia. . Breastfeeding: no, couldn't latch. Planning to start pumping today    Objective:     Patient Vitals for the past 18 hrs:   Temp Pulse Resp BP SpO2   21 0706 (!) 96.7 °F (35.9 °C) 72 16 119/71 97 %   21 0038 99 °F (37.2 °C) 76 14 120/65 97 %   21 2130 99.4 °F (37.4 °C) 80 16 126/79 97 %   214 99.4 °F (37.4 °C) 78  (!) 151/70    21  (!) 107  135/67    21  85  (!) 150/80    21 1954 99.2 °F (37.3 °C) 88 16 (!) 142/77 99 %   21 1939  88  135/72    21 1938 (!) 101.2 °F (38.4 °C)       21 193 (!) 101.3 °F (38.5 °C)       21 191  (!) 111  132/69    21 1739  (!) 108  124/64    21 1709  84  135/82    21 1640  74  131/77    21 1634   16          Temp (24hrs), Av.4 °F (37.4 °C), Min:96.7 °F (35.9 °C), Max:101.3 °F (38.5 °C)      Physical Exam:    Patient without distress. Lab/Data Review: All lab results for the last 24 hours reviewed. Assessment and Plan:     Patient appears to be having an uncomplicated postpartum course. Continue routine perineal care and maternal education.

## 2021-08-23 NOTE — PROGRESS NOTES
Chart reviewed - first time parent. SW met with patient while social distancing w/appropriate PPE. Patient without a PCP. With patient's permission, referral will be made to [facility] PCP Coordinator.  provided education on [facility] [place] Home Visit Program.  Family declined referral for home visit. Patient given informational packet on  mood & anxiety disorders (resources/education). Family denies any additional needs from  at this time. Family has 's contact information should any needs/questions arise.     FAM Ruth  [place]   [phone]

## 2021-08-23 NOTE — LACTATION NOTE
This note was copied from a baby's chart. In to see mom and infant for the initial. Mom stated that she has decided to formula feed only. Mom to request lactation consultant if needed.

## 2021-08-23 NOTE — PROGRESS NOTES
Asked by pt to fax her lab results to her cardiologist, [facility]. Fax went through and pt notified. Pt states she has been seen by cardiologist for 5 yeas for her high triglycerides.

## 2021-08-24 VITALS
HEART RATE: 78 BPM | RESPIRATION RATE: 18 BRPM | OXYGEN SATURATION: 99 % | TEMPERATURE: 98.4 F | DIASTOLIC BLOOD PRESSURE: 76 MMHG | SYSTOLIC BLOOD PRESSURE: 133 MMHG

## 2021-08-24 PROCEDURE — 36415 COLL VENOUS BLD VENIPUNCTURE: CPT

## 2021-08-24 PROCEDURE — 74011250637 HC RX REV CODE- 250/637: Performed by: OBSTETRICS & GYNECOLOGY

## 2021-08-24 PROCEDURE — 87529 HSV DNA AMP PROBE: CPT

## 2021-08-24 RX ORDER — IBUPROFEN 600 MG/1
600 TABLET ORAL EVERY 6 HOURS
Qty: 60 TABLET | Refills: 0 | Status: SHIPPED | OUTPATIENT
Start: 2021-08-24 | End: 2021-10-04

## 2021-08-24 RX ADMIN — IBUPROFEN 800 MG: 800 TABLET, FILM COATED ORAL at 08:29

## 2021-08-24 RX ADMIN — IBUPROFEN 800 MG: 800 TABLET, FILM COATED ORAL at 14:06

## 2021-08-24 RX ADMIN — DOCUSATE SODIUM 100 MG: 100 CAPSULE, LIQUID FILLED ORAL at 08:29

## 2021-08-24 NOTE — PROGRESS NOTES
Infant transferred to [facility] due to tachypnea. Per Ranjit, at this time, it is unknown as to what is the cause of the infant's tachypnea. Mother does not have HSV labs in chart. Ranjit states he is not sure if mother's recent HSV outbreak is primary or not and if it was a primary outbreak, it could be the cause of the infant's increased respirations. Ranjit would like HSV titers for IGG and IGM drawn on mom. Received order for labs from mother's MD. Lab notified of new orders.

## 2021-08-24 NOTE — PROGRESS NOTES
Patient discharged to home per Dr. Lorena Estevez orders. Discharge instructions reviewed with patient and pt given a copy. Questions encouraged and answered. Patient verbalizes understanding. Patient escorted by MIU staff CHRIS Ggae)  to private vehicle. Pt declines w/c for d/c. Stable at discharge.

## 2021-08-24 NOTE — PROGRESS NOTES
[facility]  [address] [place], [place] [address], [address]  [address] [facility], MD, Kristyn Kennedy, [facility]    Patient is S/P vaginal delivery at 39 weeks, IOL. No complaints today. Lochia < menses. No GI/ issues. No F/C. VITALS  Patient Vitals for the past 24 hrs:   Temp Pulse Resp BP SpO2   21 0713 98.7 °F (37.1 °C) 83 16 105/65 100 %   21 0030 98.3 °F (36.8 °C) 90 16 126/70 98 %   21 1411 98.1 °F (36.7 °C) 74 18 120/80 97 %        CV - RRR  LUNGS - CTA bilaterally  ABD - soft, approp tend, FF below umbilicus  EXT - tr edema bilaterally          Labs:  No results found for this or any previous visit (from the past 24 hour(s)). PPD #2      Pt is bottle feeding. No issues or complaints today. Stable. Fever < 48 hrs ago, will cont close obs.  Routine PP care      Russell Cramer MD  8:58 AM  21 No. CARLTON screening performed.  STOP BANG Legend: 0-2 = LOW Risk; 3-4 = INTERMEDIATE Risk; 5-8 = HIGH Risk

## 2021-08-24 NOTE — DISCHARGE INSTRUCTIONS
Patient Education        After Your Delivery (the Postpartum Period): Care Instructions  Your Care Instructions     Congratulations on the birth of your baby. Like pregnancy, the  period can be a time of excitement, lisa, and exhaustion. You may look at your wondrous little baby and feel happy. You may also be overwhelmed by your new sleep hours and new responsibilities. At first, babies often sleep during the days and are awake at night. They do not have a pattern or routine. They may make sudden gasps, jerk themselves awake, or look like they have crossed eyes. These are all normal, and they may even make you smile. In these first weeks after delivery, try to take good care of yourself. It may take 4 to 6 weeks to feel like yourself again, and possibly longer if you had a  birth. You will likely feel very tired for several weeks. Your days will be full of ups and downs, but lots of lisa as well. Follow-up care is a key part of your treatment and safety. Be sure to make and go to all appointments, and call your doctor if you are having problems. It's also a good idea to know your test results and keep a list of the medicines you take. How can you care for yourself at home? Take care of your body after delivery  · Use pads instead of tampons for the bloody flow that may last as long as 2 weeks. · Ease cramps with ibuprofen (Advil, Motrin). · Ease soreness of hemorrhoids and the area between your vagina and rectum with ice compresses or witch hazel pads. · Ease constipation by drinking lots of fluid and eating high-fiber foods. Ask your doctor about over-the-counter stool softeners. · Cleanse yourself with a gentle squeeze of warm water from a bottle instead of wiping with toilet paper. · Take a sitz bath in warm water several times a day. · Wear a good nursing bra. Ease sore and swollen breasts with warm, wet washcloths.   · If you are not breastfeeding, use ice rather than heat for breast soreness. · Your period may not start for several months if you are breastfeeding. You may bleed more, and longer at first, than you did before you got pregnant. · Wait until you are healed (about 4 to 6 weeks) before you have sexual intercourse. Your doctor will tell you when it is okay to have sex. · Try not to travel with your baby for 5 or 6 weeks. If you take a long car trip, make frequent stops to walk around and stretch. Pelvic rest for 6wk  NO driving for 2wk or while taking pain medication. NO lifting >10lb for 2wk. NO tub baths, pools, or hot tubs for 6wk  Avoid exhaustion  · Rest every day. Try to nap when your baby naps. · Ask another adult to be with you for a few days after delivery. · Plan for  if you have other children. · Stay flexible so you can eat at odd hours and sleep when you need to. Both you and your baby are making new schedules. · Plan small trips to get out of the house. Change can make you feel less tired. · Ask for help with housework, cooking, and shopping. Remind yourself that your job is to care for your baby. Know about help for postpartum depression  · \"Baby blues\" are common for the first 1 to 2 weeks after birth. You may cry or feel sad or irritable for no reason. · Rest whenever you can. Being tired makes it harder to handle your emotions. · Go for walks with your baby. · Talk to your partner, friends, and family about your feelings. · If your symptoms last for more than a few weeks, or if you feel very depressed, ask your doctor for help. · Postpartum depression can be treated. Support groups and counseling can help. Sometimes medicine can also help. Stay healthy  · Eat healthy foods so you have more energy and lose extra baby pounds. · If you breastfeed, avoid drugs. If you quit smoking during pregnancy, try to stay smoke-free.  If you choose to have a drink now and then, have only one drink, and limit the number of occasions that you have a drink. Wait to breastfeed at least 2 hours after you have a drink to reduce the amount of alcohol the baby may get in the milk. · Start daily exercise after 4 to 6 weeks, but rest when you feel tired. · Learn exercises to tone your belly. Do Kegel exercises to regain strength in your pelvic muscles. You can do these exercises while you stand or sit. ? Squeeze the same muscles you would use to stop your urine. Your belly and thighs should not move. ? Hold the squeeze for 3 seconds, and then relax for 3 seconds. ? Start with 3 seconds. Then add 1 second each week until you are able to squeeze for 10 seconds. ? Repeat the exercise 10 to 15 times for each session. Do three or more sessions each day. · Find a class for new mothers and new babies that has an exercise time. · If you had a  birth, give yourself a bit more time before you exercise, and be careful. When should you call for help? Call  911 anytime you think you may need emergency care. For example, call if:    · You have thoughts of harming yourself, your baby, or another person.     · You passed out (lost consciousness).     · You have chest pain, are short of breath, or cough up blood.     · You have a seizure. Call your doctor now or seek immediate medical care if:    · You have severe vaginal bleeding. This means you are passing blood clots and soaking through a pad each hour for 2 or more hours.     · You are dizzy or lightheaded, or you feel like you may faint.     · You have a fever.     · You have new or more belly pain.     · You have signs of a blood clot in your leg (called a deep vein thrombosis), such as:  ? Pain in the calf, back of the knee, thigh, or groin. ? Redness and swelling in your leg or groin.     · You have signs of preeclampsia, such as:  ? Sudden swelling of your face, hands, or feet. ? New vision problems (such as dimness, blurring, or seeing spots). ? A severe headache.    Watch closely for changes in your health, and be sure to contact your doctor if:    · Your vaginal bleeding seems to be getting heavier.     · You have new or worse vaginal discharge.     · You feel sad, anxious, or hopeless for more than a few days.     · You do not get better as expected. Where can you learn more? Go to http://www.eldrdige.com/  Enter A461 in the search box to learn more about \"After Your Delivery (the Postpartum Period): Care Instructions. \"  Current as of: October 8, 2020               Content Version: 12.8  © 0977-7591 [employer]. Care instructions adapted under license by [employer] (which disclaims liability or warranty for this information). If you have questions about a medical condition or this instruction, always ask your healthcare professional. [address] any warranty or liability for your use of this information.

## 2021-08-24 NOTE — PROGRESS NOTES
Call from nursing - pt desires D/C today.   D/W her concern for fever - pt to watch at home and return if becomes febrile  Routine PP instructions  Shante Peters MD  9:32 AM  08/24/21

## 2021-08-24 NOTE — PROGRESS NOTES
Report received from Martin Camacho RN care assumed. Pt sitting up in bed with call light in reach. No complaints at this time.

## 2021-08-25 NOTE — DISCHARGE SUMMARY
[address], [place] [address], [address] [facility] [address]  [address], [place], Brooke Adams, [facility]  Date of Admission:  2021  5:35 PM  Date of Discharge:  2021  5:12 PM    Encounter Diagnoses   Name Primary?  Vaginal delivery     Encounter for induction of labor         Russell Phipps 23 y.o.  presented at 39w0d for induction  Pt had  without incident. See delivery note for all delivery details. Pt's PP course was uneventful. On day of D/C, she was ambulating well, afebrile, with lochia < menses. She was discharged home with medications as below. Pt was bottle feeding on discharge. Routine PP instructions given to patient. She is to follow up with [facility] in 6 weeks for PP exam.    Discharge Medication List as of 2021  3:36 PM      START taking these medications    Details   ibuprofen (MOTRIN) 600 mg tablet Take 1 Tablet by mouth every six (6) hours. , Normal, Disp-60 Tablet, R-0         CONTINUE these medications which have NOT CHANGED    Details   ferrous sulfate 325 mg (65 mg iron) tablet Take 1 Tablet by mouth daily.  With vitamin C source to help abosption, Normal, Disp-30 Tablet, R-3      prenatal multivit-ca-min-fe-fa tab Take  by mouth., Historical Med         STOP taking these medications       valACYclovir (VALTREX) 500 mg tablet Comments:   Reason for Stopping:         valACYclovir (VALTREX) 1 gram tablet Comments:   Reason for Stopping:         acetaminophen (Tylenol Extra Strength) 500 mg tablet Comments:   Reason for Stopping:         omega-3/dha/epa/dpa/fish oil (OMEGA-3 2100 PO) Comments:   Reason for Stopping:               Douglas Lopes MD  10:41 AM  21

## 2021-08-27 LAB
HSV1 DNA SPEC QL NAA+PROBE: NEGATIVE
HSV2 DNA SPEC QL NAA+PROBE: NEGATIVE
SPECIMEN SOURCE: NORMAL

## 2021-09-17 PROBLEM — Z34.90 ENCOUNTER FOR INDUCTION OF LABOR: Status: RESOLVED | Noted: 2021-08-22 | Resolved: 2021-09-17

## 2021-09-17 PROBLEM — N90.0 VIN I (VULVAR INTRAEPITHELIAL NEOPLASIA I): Status: ACTIVE | Noted: 2021-09-17

## 2021-10-02 PROBLEM — O34.42 HISTORY OF CERVICAL LEEP BIOPSY AFFECTING CARE OF MOTHER, ANTEPARTUM, SECOND TRIMESTER: Status: RESOLVED | Noted: 2021-05-10 | Resolved: 2021-10-02

## 2021-10-02 PROBLEM — O36.8390 FETAL ARRHYTHMIA AFFECTING PREGNANCY, ANTEPARTUM: Status: RESOLVED | Noted: 2021-05-11 | Resolved: 2021-10-02

## 2021-10-04 PROBLEM — O98.513 HERPES VIRUS INFECTION IN MOTHER DURING THIRD TRIMESTER OF PREGNANCY: Status: RESOLVED | Noted: 2021-08-12 | Resolved: 2021-10-04

## 2021-10-04 PROBLEM — O09.93 HIGH-RISK PREGNANCY IN THIRD TRIMESTER: Status: RESOLVED | Noted: 2021-04-30 | Resolved: 2021-10-04

## 2021-10-04 PROBLEM — O99.013 ANEMIA AFFECTING PREGNANCY IN THIRD TRIMESTER: Status: RESOLVED | Noted: 2021-06-03 | Resolved: 2021-10-04

## 2021-10-04 PROBLEM — B00.9 HERPES VIRUS INFECTION IN MOTHER DURING THIRD TRIMESTER OF PREGNANCY: Status: RESOLVED | Noted: 2021-08-12 | Resolved: 2021-10-04

## 2022-03-18 PROBLEM — N90.0 VIN I (VULVAR INTRAEPITHELIAL NEOPLASIA I): Status: ACTIVE | Noted: 2021-09-17

## 2022-06-01 ASSESSMENT — ENCOUNTER SYMPTOMS
ORTHOPNEA: 0
RESPIRATORY NEGATIVE: 1
EYES NEGATIVE: 1
GASTROINTESTINAL NEGATIVE: 1

## 2022-06-01 NOTE — PROGRESS NOTES
[address], [address] [place] [address]      Patient:  Elodia Prieto  1995       SUBJECTIVE:  Elodia Prieto is a  33 y.o. female seen for a visit regarding the following:     No chief complaint on file. CC: hypertriglyceridemia    HPI:   33 y.o. female  with a history of mixed HLD with hypertriglyceridemia,  COVID January 2022     She reports that she has been following with cardiology in the [facility] region previously and has recently moved to this part of the [facility] and is looking to establish care locally. States that she has known about having hypertriglyceridemia since about 2017. She has had sequelae including pancreatitis in the past approximately 2 times. These episodes were severe, thankfully she has not had any recent episodes. She reports that she had a child approximately 9 months ago was taken off her statin for this reason, she is not currently breast-feeding is looking to get back on her medications. At this time currently taking niacin 500 mg, fenofibrate 160 mg daily. Denies chest pain, shortness of breath, cough, wheeze, difficulty moving arms or legs, abdominal pain, nausea, vomiting, leg swelling. She is not aware of any family members that have hypertriglyceridemia. No family history of cardiovascular medical problems that she is aware of. Otherwise no acute complaints at this time. Past medical history, past surgical history, family history, social history, and medications were all reviewed with the patient today and updated as necessary.          Patient Active Problem List    Diagnosis Date Noted    EVITA I (vulvar intraepithelial neoplasia I) 09/17/2021     On bx during pregnancy           Family History   Problem Relation Age of Onset    Diabetes Neg Hx     Hypertension Mother        Social History     Tobacco Use    Smoking status: Never Smoker    Smokeless tobacco: Never Used   Substance Use Topics    Alcohol use: Not Currently       Review of Systems   Constitutional: Negative. HENT: Negative. Eyes: Negative. Cardiovascular: Negative for chest pain, dyspnea on exertion, leg swelling, near-syncope, orthopnea, palpitations, paroxysmal nocturnal dyspnea and syncope. Respiratory: Negative. Negative for shortness of breath. Endocrine: Negative. Hematologic/Lymphatic: Negative. Skin: Negative. Musculoskeletal: Negative. Negative for muscle cramps, muscle weakness and myalgias. Gastrointestinal: Negative. Negative for abdominal pain, nausea and vomiting. Genitourinary: Negative. Neurological: Negative. All other systems reviewed and are negative. PHYSICAL EXAM:    /80   Pulse 80   Ht 5' 4\" (1.626 m)   Wt 109 lb (49.4 kg)   BMI 18.71 kg/m²     Physical Exam  Vitals reviewed. Constitutional:       General: She is not in acute distress. Appearance: She is normal weight. She is not ill-appearing, toxic-appearing or diaphoretic. HENT:      Head: Normocephalic and atraumatic. Right Ear: External ear normal.      Left Ear: External ear normal.      Nose: Nose normal.   Eyes:      General: No scleral icterus. Right eye: No discharge. Left eye: No discharge. Conjunctiva/sclera: Conjunctivae normal.      Pupils: Pupils are equal, round, and reactive to light. Neck:      Vascular: No carotid bruit (bilaterally). Cardiovascular:      Rate and Rhythm: Normal rate and regular rhythm. Pulses: Normal pulses. Heart sounds: Normal heart sounds. No murmur heard. No friction rub. No gallop. Pulmonary:      Effort: Pulmonary effort is normal. No respiratory distress. Breath sounds: Normal breath sounds. No stridor. No wheezing, rhonchi or rales. Abdominal:      General: Abdomen is flat. There is no distension. Palpations: Abdomen is soft. Musculoskeletal:         General: No swelling. Normal range of motion.       Cervical back: Normal range of motion. Right lower leg: No edema. Left lower leg: No edema. Skin:     General: Skin is warm and dry. Neurological:      General: No focal deficit present. Mental Status: She is alert and oriented to person, place, and time. Mental status is at baseline. Motor: No weakness. Gait: Gait normal.   Psychiatric:         Mood and Affect: Mood normal.         Thought Content: Thought content normal.         Judgment: Judgment normal.         Medical problems, medical history, and test results were reviewed with the patient today. No results found for this or any previous visit (from the past 168 hour(s)). Current Outpatient Medications:     fenofibrate (TRIGLIDE) 160 MG tablet, Take 1 tablet by mouth daily, Disp: 90 tablet, Rfl: 3    rosuvastatin (CRESTOR) 5 MG tablet, Take 1 tablet by mouth daily, Disp: 90 tablet, Rfl: 3    Icosapent Ethyl (VASCEPA) 1 g CAPS capsule, Take 2 capsules by mouth 2 times daily, Disp: 180 capsule, Rfl: 3    niacin (NIASPAN) 500 MG extended release tablet, Take 1 tablet by mouth daily, Disp: 90 tablet, Rfl: 3    ASSESSMENT/PLAN:    1. Hypertriglyceridemia  2. Mixed hyperlipidemia  3. Familial hyperlipidemia  - Patient with known history of hypertriglyceridemia since about 2017  - Labs drawn [place], [place]: 4/26/22: 2770 tri, total 239.   - EKG personally reviewed in office today demonstrates Sinus  Rhythm , RATE 80 BPM , WITHIN NORMAL LIMITS  - Reasonable to continue fenofibrate, niacin. - resume rosuvastatin 5 mg oral once daily  - resume Vascepa 2 g BID with meals  - discussed that statin will need to be discontinued if the patient becomes pregnant or is looking to become pregnant and that she will need to notify us immediately so medications changes could be made.  She voiced understanding.   - check labs in 2 months fasting including fasting lipids, lipoprotein a level, CMP, CK.  -Discussed that there is a potential for myalgias with combination of statin and fenofibrate, at this time due to severe hyper triglyceridemia benefits outweigh the risks. Instructed patient to call should she develop muscle aches or pains. Labs in 2 months. Follow-up in 3 months. Problem List Items Addressed This Visit     None      Visit Diagnoses     Establishing care with new doctor, encounter for    -  Primary    Relevant Orders    EKG 12 Lead (Completed)    Hypertriglyceridemia        Relevant Medications    fenofibrate (TRIGLIDE) 160 MG tablet    rosuvastatin (CRESTOR) 5 MG tablet    Icosapent Ethyl (VASCEPA) 1 g CAPS capsule    niacin (NIASPAN) 500 MG extended release tablet    Other Relevant Orders    Comprehensive Metabolic Panel    CK    Lipid Panel    Lipoprotein A (LPA)    Mixed hyperlipidemia        Relevant Medications    fenofibrate (TRIGLIDE) 160 MG tablet    rosuvastatin (CRESTOR) 5 MG tablet    Icosapent Ethyl (VASCEPA) 1 g CAPS capsule    niacin (NIASPAN) 500 MG extended release tablet    Other Relevant Orders    Comprehensive Metabolic Panel    CK    Lipid Panel    Lipoprotein A (LPA)    Familial hyperlipidemia        Relevant Medications    fenofibrate (TRIGLIDE) 160 MG tablet    rosuvastatin (CRESTOR) 5 MG tablet    Icosapent Ethyl (VASCEPA) 1 g CAPS capsule    niacin (NIASPAN) 500 MG extended release tablet    Other Relevant Orders    Comprehensive Metabolic Panel    CK    Lipid Panel    Lipoprotein A (LPA)            Instructed patient go to ER or call 911/EMS should symptoms recur or worsen. Patient has been instructed and agrees to call our office with any issues or other concerns related to their cardiac condition(s) and/or complaint(s). No follow-up provider specified.     Robb Chaudhary MD, DO  6/2/2022

## 2022-06-02 ENCOUNTER — OFFICE VISIT (OUTPATIENT)
Dept: CARDIOLOGY CLINIC | Age: 27
End: 2022-06-02
Payer: COMMERCIAL

## 2022-06-02 VITALS
DIASTOLIC BLOOD PRESSURE: 80 MMHG | HEART RATE: 80 BPM | WEIGHT: 109 LBS | SYSTOLIC BLOOD PRESSURE: 118 MMHG | BODY MASS INDEX: 18.61 KG/M2 | HEIGHT: 64 IN

## 2022-06-02 DIAGNOSIS — E78.2 MIXED HYPERLIPIDEMIA: ICD-10-CM

## 2022-06-02 DIAGNOSIS — E78.1 HYPERTRIGLYCERIDEMIA: ICD-10-CM

## 2022-06-02 DIAGNOSIS — Z76.89 ESTABLISHING CARE WITH NEW DOCTOR, ENCOUNTER FOR: Primary | ICD-10-CM

## 2022-06-02 DIAGNOSIS — E78.49 FAMILIAL HYPERLIPIDEMIA: ICD-10-CM

## 2022-06-02 PROCEDURE — 99204 OFFICE O/P NEW MOD 45 MIN: CPT | Performed by: INTERNAL MEDICINE

## 2022-06-02 PROCEDURE — 93000 ELECTROCARDIOGRAM COMPLETE: CPT | Performed by: INTERNAL MEDICINE

## 2022-06-02 RX ORDER — NIACIN 500 MG/1
500 TABLET, EXTENDED RELEASE ORAL DAILY
COMMUNITY
End: 2022-06-02 | Stop reason: SDUPTHER

## 2022-06-02 RX ORDER — ROSUVASTATIN CALCIUM 5 MG/1
5 TABLET, COATED ORAL DAILY
Qty: 90 TABLET | Refills: 3 | Status: SHIPPED | OUTPATIENT
Start: 2022-06-02

## 2022-06-02 RX ORDER — NIACIN 500 MG/1
500 TABLET, EXTENDED RELEASE ORAL DAILY
Qty: 90 TABLET | Refills: 3 | Status: SHIPPED | OUTPATIENT
Start: 2022-06-02

## 2022-06-02 RX ORDER — FENOFIBRATE 160 MG/1
160 TABLET ORAL DAILY
Qty: 90 TABLET | Refills: 3 | Status: SHIPPED | OUTPATIENT
Start: 2022-06-02

## 2022-06-02 RX ORDER — ICOSAPENT ETHYL 1000 MG/1
2 CAPSULE ORAL 2 TIMES DAILY
Qty: 180 CAPSULE | Refills: 3 | Status: SHIPPED | OUTPATIENT
Start: 2022-06-02

## 2022-06-02 ASSESSMENT — ENCOUNTER SYMPTOMS
VOMITING: 0
SHORTNESS OF BREATH: 0
ABDOMINAL PAIN: 0
NAUSEA: 0

## 2022-06-23 RX ORDER — FLUCONAZOLE 150 MG/1
150 TABLET ORAL
Qty: 2 TABLET | Refills: 0 | Status: SHIPPED | OUTPATIENT
Start: 2022-06-23 | End: 2022-09-19

## 2022-07-25 NOTE — PROGRESS NOTES
7/26/2022    Stan Doll  1995      PCP: None Provider- does have one in [facility]  Patient does not see them for regular preventative visits. HPI: 33y.o. year old No obstetric history on file. Here for annual gyn wellness exam. Karyna Haynes is almost one. STD screen - would like. BC = pills. Patient's last menstrual period was 07/04/2022. Social History     Socioeconomic History    Marital status: Single     Spouse name: None    Number of children: None    Years of education: None    Highest education level: None   Tobacco Use    Smoking status: Never    Smokeless tobacco: Never   Substance and Sexual Activity    Alcohol use: Not Currently    Drug use: Never    Sexual activity: Yes     Partners: Male     Birth control/protection: None     Comment: none     Last pap - normal cytology . 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 Lipids- followed by PCP  Gardasil - does not think she has had    Hx GDM: no      Allergies, medications, past medical and surgical history, social history, family history all reviewed.        Review of Systems    Constitutional:  Denies unexplained weight loss/gain or heat/ cold intolerance/loss of balance  ENT: Denies blurred vision, loss of hearing, hoarseness  Cardiovascular:  Denies chest pain, swelling in legs or feet, shortness of breath when lying flat  Respiratory:  Denies shortness of breath, cough greater than 2 weeks or coughing up blood  Gastro: Denies diarrhea greater than 2 weeks, rectal bleeding, bloody stools, heartburn, or constipation  :  Denies blood in urine, nocturia, dysuria or incontinence  Breast:  Denies nipple discharge, masses or pain  Skin:  Denies rash greater than 2 weeks, change in moles  Musculoskeletal/Neuro:  Denies joint pain, muscle weakness, seizures, headaches  Psych:  Denies new onset depression, anxiety, panic attacks  Heme:  Denies easy bruising, bleeding gums, frequent nosebleeds or swollen lymph nodes  GYN:  Denies bleeding or spotting between menses, heavy menses, menses longer than 7 days, pain with sex, severe menstrual cramps, bleeding after sex    Patient referred to a PCP for any significant nongyn findings on ROS. BP 96/68   Ht 5' 4\" (1.626 m)   Wt 109 lb (49.4 kg)   LMP 07/04/2022   BMI 18.71 kg/m²     Body mass index is 18.71 kg/m². Wt Readings from Last 3 Encounters:   07/26/22 109 lb (49.4 kg)   06/02/22 109 lb (49.4 kg)   03/16/22 113 lb (51.3 kg)         Pt in no distress. Alert and oriented x3. Affect bright. Well developed, well nourished. HEENT: normocephalic, atraumatic. Sclerae nonicteric. Neck supple w/o thyromegaly. Trachea midline. Lungs:  Respiratory effort normal.   Breasts: no masses or axillary lymphadenopathy  Abdomen: soft and nontender, no masses, no organomegaly, no ventral hernia  Pelvic: normal external genitalia, urethral meatus, vagina and cervix. No vulvar lesion. No abnl d/c. Bimanual exam w/o obvious masses or tenderness. Bladder nontender. Uterus normal size. Adnexae normal.  Perineum and anus normal. No hemorrhoids. Pollo Raines was seen today for annual exam.    Diagnoses and all orders for this visit:    Well woman exam    History of cervical dysplasia  -     Cancel: PAP IG, Aptima HPV and rfx 16/18,45 ([id number])  -     PAP IG, CT-NG-TV, Aptima HPV and rfx 16/18,45([id number])    History of vulvar dysplasia  -     Cancel: PAP IG, Aptima HPV and rfx 16/18,45 ([id number])  -     PAP IG, CT-NG-TV, Aptima HPV and rfx 17/74,64([id number])    Screening for cervical cancer  -     Cancel: PAP IG, Aptima HPV and rfx 16/18,45 ([id number])  -     PAP IG, CT-NG-TV, Aptima HPV and rfx 38/83,40([id number])    Screen for STD (sexually transmitted disease)  -     PAP IG, CT-NG-TV, Aptima HPV and rfx 93/74,44([id number])  -     HIV 1/2 Ag/Ab, 4TH Generation,W Rflx Confirm; Future  -     RPR; Future  -     HSV 1 and 2 Specific Ab, IgG;  Future  -     Hepatitis B Surface Antigen; Future  -     Hepatitis C Antibody; Future    Other orders  -     norgestimate-ethinyl estradiol (ORTHO-CYCLEN, 28,) 0.25-35 MG-MCG per tablet; Take 1 tablet by mouth in the morning. Pt counseling:   Gave info and rec gardasil. Staff mess sent to kaitlin. Pt can also get a pharmacy. Continue pills  Pt has hx vulvar ulcer. Had initial neg hsv serology. Tried to repeat later when I would have expected her serology to change. But for some reason the lab never ran them- x2. Pt was not charged for it. She has had no more outbreaks. Getting serology for hsv again today. If today's pap nl, ok to rtn 1yr.      Denzel Rivera MD, MD

## 2022-07-26 ENCOUNTER — OFFICE VISIT (OUTPATIENT)
Dept: OBGYN CLINIC | Age: 27
End: 2022-07-26
Payer: COMMERCIAL

## 2022-07-26 VITALS
BODY MASS INDEX: 18.61 KG/M2 | WEIGHT: 109 LBS | DIASTOLIC BLOOD PRESSURE: 68 MMHG | HEIGHT: 64 IN | SYSTOLIC BLOOD PRESSURE: 96 MMHG

## 2022-07-26 DIAGNOSIS — Z87.410 HISTORY OF CERVICAL DYSPLASIA: ICD-10-CM

## 2022-07-26 DIAGNOSIS — Z11.3 SCREEN FOR STD (SEXUALLY TRANSMITTED DISEASE): ICD-10-CM

## 2022-07-26 DIAGNOSIS — Z87.412 HISTORY OF VULVAR DYSPLASIA: ICD-10-CM

## 2022-07-26 DIAGNOSIS — E78.49 FAMILIAL HYPERLIPIDEMIA: ICD-10-CM

## 2022-07-26 DIAGNOSIS — Z01.419 WELL WOMAN EXAM: Primary | ICD-10-CM

## 2022-07-26 DIAGNOSIS — E78.2 MIXED HYPERLIPIDEMIA: ICD-10-CM

## 2022-07-26 DIAGNOSIS — Z12.4 SCREENING FOR CERVICAL CANCER: ICD-10-CM

## 2022-07-26 DIAGNOSIS — E78.1 HYPERTRIGLYCERIDEMIA: ICD-10-CM

## 2022-07-26 PROCEDURE — 99395 PREV VISIT EST AGE 18-39: CPT | Performed by: OBSTETRICS & GYNECOLOGY

## 2022-07-26 RX ORDER — NORGESTIMATE AND ETHINYL ESTRADIOL 0.25-0.035
1 KIT ORAL DAILY
Qty: 1 PACKET | Refills: 3 | Status: SHIPPED | OUTPATIENT
Start: 2022-07-26

## 2022-07-27 LAB
HBV SURFACE AG SER QL: NONREACTIVE
HCV AB SER QL: NONREACTIVE
HIV 1+2 AB+HIV1 P24 AG SERPL QL IA: NONREACTIVE
HIV 1/2 RESULT COMMENT: NORMAL
RPR SER QL: NONREACTIVE

## 2022-07-30 LAB
HSV1 IGG SER IA-ACNC: NORMAL INDEX
HSV2 IGG SER IA-ACNC: NORMAL INDEX

## 2022-08-01 LAB
C TRACH RRNA CVX QL NAA+PROBE: NEGATIVE
CYTOLOGIST CVX/VAG CYTO: ABNORMAL
CYTOLOGY CVX/VAG DOC THIN PREP: ABNORMAL
HPV APTIMA: NEGATIVE
Lab: ABNORMAL
N GONORRHOEA RRNA CVX QL NAA+PROBE: NEGATIVE
PATH REPORT.FINAL DX SPEC: ABNORMAL
PATHOLOGIST CVX/VAG CYTO: ABNORMAL
PATHOLOGIST PROVIDED ICD: ABNORMAL
STAT OF ADQ CVX/VAG CYTO-IMP: ABNORMAL
T VAGINALIS RRNA SPEC QL NAA+PROBE: NEGATIVE

## 2022-09-19 RX ORDER — FLUCONAZOLE 150 MG/1
150 TABLET ORAL
Qty: 2 TABLET | Refills: 0 | Status: SHIPPED | OUTPATIENT
Start: 2022-09-19

## 2022-10-21 ENCOUNTER — TELEPHONE (OUTPATIENT)
Dept: CARDIOLOGY CLINIC | Age: 27
End: 2022-10-21

## 2022-10-21 NOTE — TELEPHONE ENCOUNTER
Orders Placed      CK    Comprehensive Metabolic Panel    Lipid Panel    Lipoprotein A (LPA)    Informed lab orders are in the computer system. She voiced understanding.

## 2022-10-21 NOTE — TELEPHONE ENCOUNTER
Patient called and wants to make sure her orders to draw her blood prior to her upcoming appointment with . Enriqueta Burns has been sent to lab downstairs at [address]. Her office visit will be on 11/09/2022. Call patient to let her know when order has been placed.

## 2022-11-02 DIAGNOSIS — E78.2 MIXED HYPERLIPIDEMIA: ICD-10-CM

## 2022-11-02 DIAGNOSIS — E78.49 FAMILIAL HYPERLIPIDEMIA: ICD-10-CM

## 2022-11-02 DIAGNOSIS — E78.1 HYPERTRIGLYCERIDEMIA: ICD-10-CM

## 2022-11-03 LAB
ALBUMIN SERPL-MCNC: 3.9 G/DL (ref 3.5–5)
ALBUMIN/GLOB SERPL: 0.9 {RATIO} (ref 0.4–1.6)
ALP SERPL-CCNC: 34 U/L (ref 50–136)
ALT SERPL-CCNC: 60 U/L (ref 12–65)
ANION GAP SERPL CALC-SCNC: 5 MMOL/L (ref 2–11)
AST SERPL-CCNC: 43 U/L (ref 15–37)
BILIRUB SERPL-MCNC: 0.6 MG/DL (ref 0.2–1.1)
BUN SERPL-MCNC: 7 MG/DL (ref 6–23)
CALCIUM SERPL-MCNC: 7.9 MG/DL (ref 8.3–10.4)
CHLORIDE SERPL-SCNC: 105 MMOL/L (ref 101–110)
CHOLEST SERPL-MCNC: 127 MG/DL
CO2 SERPL-SCNC: 27 MMOL/L (ref 21–32)
CREAT SERPL-MCNC: 0.6 MG/DL (ref 0.6–1)
GLOBULIN SER CALC-MCNC: 4.3 G/DL (ref 2.8–4.5)
GLUCOSE SERPL-MCNC: 92 MG/DL (ref 65–100)
HDLC SERPL-MCNC: 17 MG/DL (ref 40–60)
HDLC SERPL: 7.5 {RATIO}
LDLC SERPL CALC-MCNC: ABNORMAL MG/DL
LDLC SERPL DIRECT ASSAY-MCNC: 30 MG/DL
POTASSIUM SERPL-SCNC: 3.8 MMOL/L (ref 3.5–5.1)
PROT SERPL-MCNC: 8.2 G/DL (ref 6.3–8.2)
SODIUM SERPL-SCNC: 137 MMOL/L (ref 133–143)
TRIGL SERPL-MCNC: 1633 MG/DL (ref 35–150)
VLDLC SERPL CALC-MCNC: ABNORMAL MG/DL (ref 6–23)

## 2022-11-04 LAB — LPA SERPL-SCNC: 32.1 NMOL/L

## 2022-11-08 NOTE — PROGRESS NOTES
[address] [id number] [facility], [address]      Patient:  Simon Rosario  1995       SUBJECTIVE:  Simon Rosario is a  33 y.o. female seen for a follow up visit regarding the following:     Chief Complaint   Patient presents with    Hyperlipidemia     Had Labs      CC: Hypertriglyceridemia     HPI:   33 y.o. female  with a history of mixed HLD with hypertriglyceridemia,  COVID January 2022     Patient was last seen in office on 6/2/22 , since then reports that she has been doing well. No new medications, no changes in medications. She has been taking her medications as directed without missing doses. No interval ER visits or hospitalizations. Denies chest pain, shortness of breath, cough, wheeze, dizziness, lightheadedness, abdominal pain, nausea, vomiting, leg swelling or muscle aches/muscle cramps. Currently on Sprintec for oral contraception, not currently try to get pregnant she reports. No other complaints at this time. Past medical history, past surgical history, family history, social history, and medications were all reviewed with the patient today and updated as necessary. Patient Active Problem List    Diagnosis Date Noted    EVITA I (vulvar intraepithelial neoplasia I) 09/17/2021     On bx during pregnancy           Family History   Problem Relation Age of Onset    Diabetes Neg Hx     Hypertension Mother        Social History     Tobacco Use    Smoking status: Never    Smokeless tobacco: Never   Substance Use Topics    Alcohol use: Not Currently     Review of Systems   Constitutional: Negative. Negative for chills and fever. Eyes:  Negative for blurred vision and double vision. Cardiovascular: Negative. Negative for chest pain, dyspnea on exertion, leg swelling, palpitations and syncope. Respiratory:  Negative for cough and shortness of breath. Gastrointestinal: Negative. Neurological: Negative.       PHYSICAL EXAM:  /68 AM   Result Value Ref Range    Cholesterol, Total 127 <200 MG/DL    Triglycerides 1,633 (H) 35 - 150 MG/DL    HDL 17 (L) 40 - 60 MG/DL    LDL Calculated  <100 MG/DL     LDL not calculated due to elevated triglyceride level. VLDL Cholesterol Calculated Not calculated due to elevated triglyceride level 6.0 - 23.0 MG/DL    Chol/HDL Ratio 7.5     Lipoprotein A (LPA)    Collection Time: 11/02/22 11:15 AM   Result Value Ref Range    Lipoprotein (a) 32.1 <75.0 nmol/L   LDL Cholesterol, Direct    Collection Time: 11/02/22 11:15 AM   Result Value Ref Range    LDL Direct 30 <100 mg/dl         Current Outpatient Medications:     norgestimate-ethinyl estradiol (SPRINTEC 28) 0.25-35 MG-MCG per tablet, Take 1 tablet by mouth daily, Disp: , Rfl:     fluconazole (DIFLUCAN) 150 MG tablet, TAKE 1 TABLET BY MOUTH EVERY 48 HOURS. (Patient taking differently: Take 150 mg by mouth as needed), Disp: 2 tablet, Rfl: 0    fenofibrate (TRIGLIDE) 160 MG tablet, Take 1 tablet by mouth daily, Disp: 90 tablet, Rfl: 3    rosuvastatin (CRESTOR) 5 MG tablet, Take 1 tablet by mouth daily, Disp: 90 tablet, Rfl: 3    Icosapent Ethyl (VASCEPA) 1 g CAPS capsule, Take 2 capsules by mouth 2 times daily, Disp: 180 capsule, Rfl: 3    niacin (NIASPAN) 500 MG extended release tablet, Take 1 tablet by mouth daily, Disp: 90 tablet, Rfl: 3     ASSESSMENT/PLAN:    1. Hypertriglyceridemia  2. Mixed hyperlipidemia  3. Familial hyperlipidemia  - Patient with known history of hypertriglyceridemia since about 2017    - Reasonable to continue fenofibrate, niacin. - rosuvastatin 5 mg oral once daily   - Vascepa 2 g BID with meals  - discussed that statin will need to be discontinued if the patient becomes pregnant or is looking to become pregnant and that she will need to notify us immediately so medications changes could be made. She voiced understanding and reports she is currently on OCP and is not trying to become pregnant.    - will refer patient to  [facility] for lipidology evaluation and management. Discussed this with the patient today , she is agreeable. Problem List Items Addressed This Visit    None  Visit Diagnoses       Hypertriglyceridemia    -  Primary    Mixed hyperlipidemia        Familial hyperlipidemia                Instructed patient go to ER or call 911/EMS should symptoms recur or worsen. Patient has been instructed and agrees to call our office with any issues or other concerns related to their cardiac condition(s) and/or complaint(s). No follow-ups on file.     Mavis Bailon DO  11/9/2022 9:38 AM

## 2022-11-09 ENCOUNTER — OFFICE VISIT (OUTPATIENT)
Dept: CARDIOLOGY CLINIC | Age: 27
End: 2022-11-09
Payer: COMMERCIAL

## 2022-11-09 VITALS
BODY MASS INDEX: 18.78 KG/M2 | DIASTOLIC BLOOD PRESSURE: 68 MMHG | SYSTOLIC BLOOD PRESSURE: 110 MMHG | HEART RATE: 92 BPM | HEIGHT: 64 IN | WEIGHT: 110 LBS

## 2022-11-09 DIAGNOSIS — E78.1 HYPERTRIGLYCERIDEMIA: Primary | ICD-10-CM

## 2022-11-09 DIAGNOSIS — E78.2 MIXED HYPERLIPIDEMIA: ICD-10-CM

## 2022-11-09 DIAGNOSIS — E78.49 FAMILIAL HYPERLIPIDEMIA: ICD-10-CM

## 2022-11-09 PROCEDURE — 99213 OFFICE O/P EST LOW 20 MIN: CPT | Performed by: INTERNAL MEDICINE

## 2022-11-09 RX ORDER — NORGESTIMATE AND ETHINYL ESTRADIOL 0.25-0.035
1 KIT ORAL DAILY
COMMUNITY

## 2022-11-09 ASSESSMENT — ENCOUNTER SYMPTOMS
DOUBLE VISION: 0
BLURRED VISION: 0
SHORTNESS OF BREATH: 0
GASTROINTESTINAL NEGATIVE: 1
COUGH: 0

## 2023-10-09 NOTE — PROGRESS NOTES
INSURANCE COVERAGE REGARDING PAYMENT  FOR YOUR COLONOSCOPY        Colon Cancer is the second leading cause of death among cancers, per the [address], [address]. It is preventable. Early detection is the key. Your doctor will determine which tests need to be done for prevention and/or treatment. However, colonoscopies can be performed for several reasons:     Screening To screen for any problems within the colon. In this case, the patient is not symptomatic and does not have a personal history of previous colon cancer/condition or abnormal findings. Billed as screening. Surveillance To monitor for a previously diagnosed colon condition (such as polyps) or when performed at more frequent intervals than every ten years because the patient has a personal/family history of colonic polyps or colon cancer. Billed as diagnostic. Diagnostic Patient with symptoms, used to evaluate the colon. Billed as diagnostic. If during a screening colonoscopy, our physician finds an abnormality, performs a biopsy or polypectomy (removal of polyp), your insurance company may consider the procedure to be a diagnostic exam and no longer a screening procedure. Every insurance company is different. We encourage you to call your insurance company regarding plan benefits. Generally, screening benefits and diagnostic benefits may be paid at different levels. Charges associated with anesthesia or type of facility may also be processed differently. This varies with each insurance company, so we want you to be aware of this prior to your procedure. You do not have to call your insurance company if you have Medicare. For an estimate of potential charges, you may call the [address] [place] [address] at [phone], option 5. The authorization staff at [facility] will contact your insurance company to check precertification requirements for your colonoscopy.  However, precertification, which serves as notification Shift assessment complete see flowsheet. Discussed today plan of care with pt. Bleeding precautions given. Pt educated on sitz bath, pt declines sitz at this time. Pt to let RN know if she decides she wants sitz later today. No s/s distress noted. Pt to call with needs/concerns. Pt in bed with call light in reach. is never a guarantee of payment. If you have questions regarding precertification for your procedure, please contact your insurance company.

## 2024-09-24 NOTE — PROGRESS NOTES
Pushing with pt. NSG remains at bedside. Additional Notes: Pt declined influenza vaccine at this time Quality 130: Documentation Of Current Medications In The Medical Record: Current Medications Documented Detail Level: Detailed Quality 110: Preventive Care And Screening: Influenza Immunization: Influenza Immunization not Administered for Documented Reasons. Quality 226: Preventive Care And Screening: Tobacco Use: Screening And Cessation Intervention: Patient screened for tobacco use and is an ex/non-smoker

## 2025-04-17 ENCOUNTER — HOSPITAL ENCOUNTER (EMERGENCY)
Age: 30
Discharge: HOME | End: 2025-04-17
Attending: EMERGENCY MEDICINE
Payer: COMMERCIAL

## 2025-04-17 ENCOUNTER — APPOINTMENT (OUTPATIENT)
Dept: ULTRASOUND IMAGING | Age: 30
End: 2025-04-17
Payer: COMMERCIAL

## 2025-04-17 ENCOUNTER — APPOINTMENT (OUTPATIENT)
Dept: CT IMAGING | Age: 30
End: 2025-04-17
Payer: COMMERCIAL

## 2025-04-17 VITALS
SYSTOLIC BLOOD PRESSURE: 126 MMHG | HEIGHT: 64 IN | TEMPERATURE: 97.5 F | DIASTOLIC BLOOD PRESSURE: 87 MMHG | BODY MASS INDEX: 19.29 KG/M2 | RESPIRATION RATE: 16 BRPM | OXYGEN SATURATION: 99 % | HEART RATE: 88 BPM | WEIGHT: 113 LBS

## 2025-04-17 DIAGNOSIS — R10.31 RIGHT LOWER QUADRANT ABDOMINAL PAIN: Primary | ICD-10-CM

## 2025-04-17 DIAGNOSIS — N83.201 CYST OF RIGHT OVARY: ICD-10-CM

## 2025-04-17 LAB
ALBUMIN SERPL-MCNC: 4.1 G/DL (ref 3.5–5)
ALBUMIN/GLOB SERPL: 1.1 (ref 1–1.9)
ALP SERPL-CCNC: 41 U/L (ref 35–104)
ALT SERPL-CCNC: 76 U/L (ref 8–45)
ANION GAP SERPL CALC-SCNC: 14 MMOL/L (ref 7–16)
AST SERPL-CCNC: 108 U/L (ref 15–37)
BASOPHILS # BLD: 0.04 K/UL (ref 0–0.2)
BASOPHILS NFR BLD: 0.3 % (ref 0–2)
BILIRUB SERPL-MCNC: 0.4 MG/DL (ref 0–1.2)
BILIRUB UR QL: NEGATIVE
BUN SERPL-MCNC: 8 MG/DL (ref 6–23)
CALCIUM SERPL-MCNC: 8.9 MG/DL (ref 8.8–10.2)
CHLORIDE SERPL-SCNC: 100 MMOL/L (ref 98–107)
CO2 SERPL-SCNC: 22 MMOL/L (ref 20–29)
CREAT SERPL-MCNC: 0.23 MG/DL (ref 0.6–1.1)
DIFFERENTIAL METHOD BLD: ABNORMAL
EOSINOPHIL # BLD: 0.02 K/UL (ref 0–0.8)
EOSINOPHIL NFR BLD: 0.1 % (ref 0.5–7.8)
ERYTHROCYTE [DISTWIDTH] IN BLOOD BY AUTOMATED COUNT: 15.4 % (ref 11.9–14.6)
GLOBULIN SER CALC-MCNC: 3.8 G/DL (ref 2.3–3.5)
GLUCOSE SERPL-MCNC: 91 MG/DL (ref 70–99)
GLUCOSE UR QL STRIP.AUTO: NEGATIVE MG/DL
HCG UR QL: NEGATIVE
HCT VFR BLD AUTO: 34.1 % (ref 35.8–46.3)
HGB BLD-MCNC: 11.9 G/DL (ref 11.7–15.4)
IMM GRANULOCYTES # BLD AUTO: 0.1 K/UL (ref 0–0.5)
IMM GRANULOCYTES NFR BLD AUTO: 0.7 % (ref 0–5)
KETONES UR-MCNC: ABNORMAL MG/DL
LEUKOCYTE ESTERASE UR QL STRIP: NEGATIVE
LIPASE SERPL-CCNC: 103 U/L (ref 13–60)
LYMPHOCYTES # BLD: 1.7 K/UL (ref 0.5–4.6)
LYMPHOCYTES NFR BLD: 11.7 % (ref 13–44)
MCH RBC QN AUTO: 30 PG (ref 26.1–32.9)
MCHC RBC AUTO-ENTMCNC: 34.9 G/DL (ref 31.4–35)
MCV RBC AUTO: 85.9 FL (ref 82–102)
MONOCYTES # BLD: 0.41 K/UL (ref 0.1–1.3)
MONOCYTES NFR BLD: 2.8 % (ref 4–12)
NEUTS SEG # BLD: 12.24 K/UL (ref 1.7–8.2)
NEUTS SEG NFR BLD: 84.4 % (ref 43–78)
NITRITE UR QL: NEGATIVE
NRBC # BLD: 0 K/UL (ref 0–0.2)
PH UR: 6.5 (ref 5–9)
PLATELET # BLD AUTO: 247 K/UL (ref 150–450)
PMV BLD AUTO: 10.7 FL (ref 9.4–12.3)
POTASSIUM SERPL-SCNC: 3.8 MMOL/L (ref 3.5–5.1)
PROT SERPL-MCNC: 7.8 G/DL (ref 6.3–8.2)
PROT UR QL: ABNORMAL MG/DL
RBC # BLD AUTO: 3.97 M/UL (ref 4.05–5.2)
RBC # UR STRIP: ABNORMAL
SERVICE CMNT-IMP: ABNORMAL
SODIUM SERPL-SCNC: 136 MMOL/L (ref 136–145)
SP GR UR: 1.02 (ref 1–1.02)
UROBILINOGEN UR QL: 1 EU/DL (ref 0.2–1)
WBC # BLD AUTO: 14.6 K/UL (ref 4.3–11.1)

## 2025-04-17 PROCEDURE — 99285 EMERGENCY DEPT VISIT HI MDM: CPT

## 2025-04-17 PROCEDURE — 74177 CT ABD & PELVIS W/CONTRAST: CPT

## 2025-04-17 PROCEDURE — 81003 URINALYSIS AUTO W/O SCOPE: CPT

## 2025-04-17 PROCEDURE — 6370000000 HC RX 637 (ALT 250 FOR IP): Performed by: EMERGENCY MEDICINE

## 2025-04-17 PROCEDURE — 85025 COMPLETE CBC W/AUTO DIFF WBC: CPT

## 2025-04-17 PROCEDURE — 76856 US EXAM PELVIC COMPLETE: CPT

## 2025-04-17 PROCEDURE — 6360000004 HC RX CONTRAST MEDICATION: Performed by: EMERGENCY MEDICINE

## 2025-04-17 PROCEDURE — 81025 URINE PREGNANCY TEST: CPT

## 2025-04-17 PROCEDURE — 83690 ASSAY OF LIPASE: CPT

## 2025-04-17 PROCEDURE — 80053 COMPREHEN METABOLIC PANEL: CPT

## 2025-04-17 RX ORDER — KETOROLAC TROMETHAMINE 10 MG/1
10 TABLET, FILM COATED ORAL EVERY 6 HOURS PRN
Qty: 20 TABLET | Refills: 0 | Status: SHIPPED | OUTPATIENT
Start: 2025-04-17

## 2025-04-17 RX ORDER — IOPAMIDOL 755 MG/ML
100 INJECTION, SOLUTION INTRAVASCULAR
Status: COMPLETED | OUTPATIENT
Start: 2025-04-17 | End: 2025-04-17

## 2025-04-17 RX ORDER — ACETAMINOPHEN 500 MG
1000 TABLET ORAL
Status: COMPLETED | OUTPATIENT
Start: 2025-04-17 | End: 2025-04-17

## 2025-04-17 RX ORDER — DIATRIZOATE MEGLUMINE AND DIATRIZOATE SODIUM 660; 100 MG/ML; MG/ML
30 SOLUTION ORAL; RECTAL
Status: DISCONTINUED | OUTPATIENT
Start: 2025-04-17 | End: 2025-04-17 | Stop reason: HOSPADM

## 2025-04-17 RX ADMIN — ACETAMINOPHEN 1000 MG: 500 TABLET, FILM COATED ORAL at 09:26

## 2025-04-17 RX ADMIN — IOPAMIDOL 100 ML: 755 INJECTION, SOLUTION INTRAVENOUS at 10:01

## 2025-04-17 RX ADMIN — DIATRIZOATE MEGLUMINE AND DIATRIZOATE SODIUM 30 ML: 660; 100 LIQUID ORAL; RECTAL at 12:13

## 2025-04-17 ASSESSMENT — LIFESTYLE VARIABLES
HOW OFTEN DO YOU HAVE A DRINK CONTAINING ALCOHOL: NEVER
HOW MANY STANDARD DRINKS CONTAINING ALCOHOL DO YOU HAVE ON A TYPICAL DAY: PATIENT DOES NOT DRINK

## 2025-04-17 ASSESSMENT — PAIN - FUNCTIONAL ASSESSMENT: PAIN_FUNCTIONAL_ASSESSMENT: 0-10

## 2025-04-17 ASSESSMENT — PAIN SCALES - GENERAL
PAINLEVEL_OUTOF10: 0
PAINLEVEL_OUTOF10: 0

## 2025-04-17 NOTE — ED PROVIDER NOTES
Emergency Department Provider Note       PCP: Not, On File (Inactive)   Age: 30 y.o.   Sex: female     DISPOSITION Decision To Discharge 04/17/2025 01:31:12 PM    ICD-10-CM    1. Right lower quadrant abdominal pain  R10.31       2. Cyst of right ovary  N83.201           Medical Decision Making     Patient is a 30-year-old female who presents with right lower abdominal pain starting last night approximately 6:30 PM and constant in nature dull achy sensation.  Patient denies any vomiting or diarrhea.  Patient states she is currently on her menstruation.  Patient denies any pain with urination urinary frequency or urgency.    Differential diagnosis includes but is not limited to ectopic pregnancy, ovarian torsion, ovarian cyst, appendicitis    Patient's physical exam is as stated.    Patient's laboratory testing does show a white count of 14,000 however patient has no fever.  Patient CT scan does not visualize the appendix however patient has no inflammatory changes noted in the right lower quadrant.  Patient's pain has resolved therefore do not believe that this represents appendicitis in addition patient is afebrile.  Patient has a right ovarian cyst likely the cause of her patient's right lower quadrant abdominal pain.  Will DC home and have patient follow-up with her gynecologist and return for any worsening or changing symptoms.  All questions answered.    15:32 I called and spoke with the patient concerning incidental findings of an indeterminate hypodense lesions within the spleen and radiologist recommending nonemergent ultrasound to determine if they are cystic in nature.  This has been communicated with the patient.  She was instructed to follow-up with primary care doctor ASAP or possibly with her gynecologist.  Patient states she does have a gynecologist.    ED Course as of 04/17/25 1533   Thu Apr 17, 2025   1328 Patient's pain has resolved.  Patient has no pain on exam.  Patient states she needs to  mmol/L    Glucose 91 70 - 99 mg/dL    BUN 8 6 - 23 MG/DL    Creatinine 0.23 (L) 0.60 - 1.10 MG/DL    Est, Glom Filt Rate >90 >60 ml/min/1.73m2    Calcium 8.9 8.8 - 10.2 MG/DL    Total Bilirubin 0.4 0.0 - 1.2 MG/DL    ALT 76 (H) 8 - 45 U/L     (H) 15 - 37 U/L    Alk Phosphatase 41 35 - 104 U/L    Total Protein 7.8 6.3 - 8.2 g/dL    Albumin 4.1 3.5 - 5.0 g/dL    Globulin 3.8 (H) 2.3 - 3.5 g/dL    Albumin/Globulin Ratio 1.1 1.0 - 1.9     Lipase   Result Value Ref Range    Lipase 103 (H) 13 - 60 U/L   POCT Urinalysis no Micro   Result Value Ref Range    Specific Gravity, Urine, POC 1.020 1.001 - 1.023      pH, Urine, POC 6.5 5.0 - 9.0      Protein, Urine, POC TRACE (A) NEG mg/dL    Glucose, UA POC Negative NEG mg/dL    Ketones, Urine, POC TRACE (A) NEG mg/dL    Bilirubin, Urine, POC Negative NEG      Blood, UA POC MODERATE (A) NEG      URINE UROBILINOGEN POC 1.0 0.2 - 1.0 EU/dL    Nitrite, Urine, POC Negative NEG      Leukocyte Est, UA POC Negative NEG      Performed by: Sandra Avila    POC Pregnancy Urine Qual   Result Value Ref Range    Preg Test, Ur Negative NEG           US PELVIS COMPLETE NON-OB TRANSABDOMINAL AND TRANSVAGINAL   Final Result   Normal pelvic ultrasound including a dominant right ovarian follicle measuring   2.3 cm. Ovaries show normal vascularization. No evidence of torsion. No free   fluid.               Electronically signed by Terry De Santiago      CT ABDOMEN PELVIS W IV CONTRAST Additional Contrast? None   Final Result   Bulky uterus and heterogeneous cervical region which is nonspecific. However if   there is clinical concern for pelvic pathology, consider pelvic ultrasound.   Indeterminant hypodense lesions within the spleen. Further characterization with   nonemergent ultrasound to determine if they are cystic is recommended.   Appendix is not discretely visualized.      If providers have any questions about this report, I can be reached on   [employer].         Electronically signed by

## 2025-04-17 NOTE — ED NOTES
Patient mobility status  with no difficulty.     I have reviewed discharge instructions with the patient.  The patient verbalized understanding.    Patient left ED via Discharge Method: ambulatory to Home with self.    Opportunity for questions and clarification provided.     Patient given 1 e scripts.

## 2025-04-17 NOTE — DISCHARGE INSTRUCTIONS
Please return if you have fevers or worsening or changing symptoms.  The first CT scan did not visualize your appendix.  Therefore a second CT scan with oral contrast was ordered and if you change your mind please return to have that study performed.    You're CT scan A/P also showed \"Indeterminant hypodense lesions within the spleen. Further characterization with nonemergent ultrasound to determine if they are cystic is recommended.\"

## 2025-04-17 NOTE — ED NOTES
Pt states she is pain free at this time and is wishing to speak to provider about discharge information.